# Patient Record
Sex: FEMALE | Race: WHITE | URBAN - METROPOLITAN AREA
[De-identification: names, ages, dates, MRNs, and addresses within clinical notes are randomized per-mention and may not be internally consistent; named-entity substitution may affect disease eponyms.]

---

## 2022-05-17 ENCOUNTER — TELEPHONE (OUTPATIENT)
Dept: PAIN MANAGEMENT | Age: 52
End: 2022-05-17

## 2022-05-17 ENCOUNTER — INITIAL CONSULT (OUTPATIENT)
Dept: PAIN MANAGEMENT | Age: 52
End: 2022-05-17
Payer: COMMERCIAL

## 2022-05-17 VITALS
HEIGHT: 64 IN | DIASTOLIC BLOOD PRESSURE: 86 MMHG | WEIGHT: 272 LBS | BODY MASS INDEX: 46.44 KG/M2 | TEMPERATURE: 97.8 F | SYSTOLIC BLOOD PRESSURE: 136 MMHG

## 2022-05-17 DIAGNOSIS — M51.36 DDD (DEGENERATIVE DISC DISEASE), LUMBAR: ICD-10-CM

## 2022-05-17 DIAGNOSIS — M25.562 CHRONIC PAIN OF LEFT KNEE: Primary | ICD-10-CM

## 2022-05-17 DIAGNOSIS — G89.29 CHRONIC BILATERAL LOW BACK PAIN WITHOUT SCIATICA: ICD-10-CM

## 2022-05-17 DIAGNOSIS — M54.50 CHRONIC BILATERAL LOW BACK PAIN WITHOUT SCIATICA: ICD-10-CM

## 2022-05-17 DIAGNOSIS — G89.29 CHRONIC PAIN OF LEFT KNEE: Primary | ICD-10-CM

## 2022-05-17 PROCEDURE — G8417 CALC BMI ABV UP PARAM F/U: HCPCS | Performed by: PAIN MEDICINE

## 2022-05-17 PROCEDURE — G8427 DOCREV CUR MEDS BY ELIG CLIN: HCPCS | Performed by: PAIN MEDICINE

## 2022-05-17 PROCEDURE — 1036F TOBACCO NON-USER: CPT | Performed by: PAIN MEDICINE

## 2022-05-17 PROCEDURE — 3017F COLORECTAL CA SCREEN DOC REV: CPT | Performed by: PAIN MEDICINE

## 2022-05-17 PROCEDURE — 99204 OFFICE O/P NEW MOD 45 MIN: CPT | Performed by: PAIN MEDICINE

## 2022-05-17 RX ORDER — ONDANSETRON 4 MG/1
TABLET, ORALLY DISINTEGRATING ORAL
COMMUNITY
Start: 2022-04-28

## 2022-05-17 RX ORDER — ASPIRIN 81 MG/1
TABLET, CHEWABLE ORAL
COMMUNITY

## 2022-05-17 RX ORDER — DICYCLOMINE HCL 20 MG
TABLET ORAL
COMMUNITY
Start: 2022-04-27

## 2022-05-17 RX ORDER — ATORVASTATIN CALCIUM 20 MG/1
TABLET, FILM COATED ORAL
COMMUNITY
Start: 2022-05-09

## 2022-05-17 RX ORDER — CLONIDINE HYDROCHLORIDE 0.2 MG/1
TABLET ORAL
COMMUNITY
Start: 2022-05-11

## 2022-05-17 RX ORDER — TIZANIDINE 4 MG/1
TABLET ORAL
COMMUNITY
Start: 2022-04-27

## 2022-05-17 ASSESSMENT — ENCOUNTER SYMPTOMS
NAUSEA: 0
CONSTIPATION: 0
BACK PAIN: 1
DIARRHEA: 0
SHORTNESS OF BREATH: 0

## 2022-05-17 NOTE — PROGRESS NOTES
[facility]) Physicians  Neurosurgery and Pain [facility]  [address], [address] [facility] , [address], [place]: [phone]  F: [phone]        Emely Mendez  (1970)    5/17/2022    Subjective:     Emely Mendez is 50 y.o. female who complains today of:    Chief Complaint   Patient presents with    Back Pain       HPI    Patient complains of chronic low back pain. The patient denies a traumatic or inciting incident. Pain has been gradual and insidious in onset. Pain is located in the lumbar spine. Pain is not radiating, but she does get some referred pain into the hips. It has been present for years. Pain is described as constatn, throbbing and aching. Pain level today is rated 6 on a 10 point scale. It is severe in nature. With pain medication (Motrin/Tylenol), pain level drops to a 6/10. Without pain medication, pain level can escalate upto a 8/10. She also takes Tizanidine occasionally at bedtime, and that help her sleep. Pain is affecting the patients ability to perform activities of daily living especially with regards to personal hygiene, household chores and self care. With pain medication, the patient is better able to perform ADLs. Pain in part is secondary to osteoarthritis of the spine. Pain is aggravated by bending, lifting, twisting, walking and standing  Pain is alleviated by rest and medication. Prior PT: Yes, she did a course here in the past and more recently with [facility] within the last year. SHe just competed it. Prior Injections: none  Prior medications: NSAIDs, muscle relaxants, opiods and analgesics  Prior spine surgeries:  none  Pain is not is associated with fevers/chills/night sweats. Bowel and bladder are working appropriately. She is also complaining of left knee pain. Acute on chronic. Throbbing, worse with ambulation.        Allergies:  Penicillins and Sulfamethoxazole-trimethoprim    Past Medical History:   Diagnosis Date    Hypertension      No past surgical history on file. Family History   Problem Relation Age of Onset    Arthritis Mother     High Blood Pressure Mother     Heart Disease Father     High Blood Pressure Father     Diabetes Father      Social History     Socioeconomic History    Marital status: Unknown     Spouse name: Not on file    Number of children: Not on file    Years of education: Not on file    Highest education level: Not on file   Occupational History    Occupation: unemployed   Tobacco Use    Smoking status: Former Smoker     Packs/day: 1.00     Years: 30.00     Pack years: 30.00     Quit date:      Years since quittin.3    Smokeless tobacco: Never Used   Substance and Sexual Activity    Alcohol use: Never    Drug use: Never    Sexual activity: Not on file   Other Topics Concern    Not on file   Social History Narrative    Not on file     Social Determinants of Health     Financial Resource Strain:     Difficulty of Paying Living Expenses: Not on file   Food Insecurity:     Worried About [address] in the Last Year: Not on file    [address] in the Last Year: Not on file   Transportation Needs:     Lack of Transportation (Medical): Not on file    Lack of Transportation (Non-Medical):  Not on file   Physical Activity:     Days of Exercise per Week: Not on file    Minutes of Exercise per Session: Not on file   Stress:     Feeling of Stress : Not on file   Social Connections:     Frequency of Communication with Friends and Family: Not on file    Frequency of Social Gatherings with Friends and Family: Not on file    Attends Yazidi Services: Not on file    Active Member of Clubs or Organizations: Not on file    Attends Club or Organization Meetings: Not on file    Marital Status: Not on file   Intimate Partner Violence:     Fear of Current or Ex-Partner: Not on file    Emotionally Abused: Not on file    Physically Abused: Not on file    Sexually Abused: Not on file   Housing Stability:     Unable to Pay for Housing in the Last Year: Not on file    Number of Places Lived in the Last Year: Not on file    Unstable Housing in the Last Year: Not on file       Current Outpatient Medications on File Prior to Visit   Medication Sig Dispense Refill    aspirin 81 MG chewable tablet Take by mouth      cloNIDine (CATAPRES) 0.2 MG tablet TAKE ONE TABLET BY MOUTH EVERY DAY      atorvastatin (LIPITOR) 20 MG tablet TAKE ONE TABLET BY MOUTH EVERY EVENING AFTER SUPPER      tiZANidine (ZANAFLEX) 4 MG tablet TAKE ONE TABLET BY MOUTH EVERY 8 HOURS AS NEEDED FOR MUSCLE SPASM      dicyclomine (BENTYL) 20 MG tablet TAKE ONE TABLET BY MOUTH EVERY 6 HOURS AS NEEDED      ondansetron (ZOFRAN-ODT) 4 MG disintegrating tablet DISSOLVE ONE TABLET IN MOUTH EVERY 6 HOURS AS NEEDED FOR NAUSEA AND VOMITING      atenolol (TENORMIN) 25 MG tablet   0    naproxen (NAPROSYN) 500 MG tablet take 1 tablet by mouth every 12 hours if needed for back pain take with food  0    hydrochlorothiazide (MICROZIDE) 12.5 MG capsule       meloxicam (MOBIC) 15 MG tablet Take 1 tablet by mouth daily Take with food, avoid other NSAIDs (Ibuprofen) and steroids 60 tablet 0     No current facility-administered medications on file prior to visit. Review of Systems   Constitutional: Negative for fever. HENT: Negative for hearing loss. Respiratory: Negative for shortness of breath. Gastrointestinal: Negative for constipation, diarrhea and nausea. Genitourinary: Negative for difficulty urinating. Musculoskeletal: Positive for back pain. Skin: Negative for rash. Neurological: Negative for headaches. Hematological: Does not bruise/bleed easily. Psychiatric/Behavioral: Negative for sleep disturbance.          Objective:     Vitals:  /86 (Site: Left Upper Arm, Position: Sitting, Cuff Size: Large Adult)   Temp 97.8 °F (36.6 °C) (Infrared)   Ht 5' 4\" (1.626 m)   Wt 272 lb (123.4 kg) BMI 46.69 kg/m² Pain Score:   8      Physical Exam    General Appearance: Obese, NAD and Conversant. Eyes: EOM intact. HENT: Atraumatic. Neck: Neck is supple and Trachea midline. Lymph: No supraclavicular lymphadenopathy. Lungs: Normal respiratory effort and No significant respiratory distress. Cardiovascular: No lower extremity ulcerations or cyanosis and Capillary refill is less than 2 seconds. Abdomen: Soft and Non tender to palpation. Extremeties: No significant lower exremity edema. Skin: Visualized skin is intact and she has normal skin turgor. Psych: Mood and affect within normal limits, Insight and judgement within normal limits and Alert and oriented. Inspection of the spine reveals no gross abnormalities in terms of curvature. ROM of the spine is within normal limits. Facet loading reproduces her symptoms. Palpation: she hasTTP over the lumbar paraspinal musculature. Muscle Tone is within normal limits in all four extremities. Strength: 5 in right upper extremity extremity. 5 in left upper extremity extremity. 5 in right lower extremity extremity. 5 in left lower extremity extremity. Neurologic:  Coordination is within normal limits. DTR's are intact and symmetric throughout. Sensation is within normal limits throughout in all four extremities to deep pressure and light touch. Gait is not within normal limits. Difficulty with heel walking, toe walking and tandem walking. Long Tract Signs: SLR: Negative. Spurling's Maneuver: Negative. Plantar Response: Downgoing bilaterally. Antunez sign is negative bilaterally    Inspection of the left knee shows no gross abnormalities or gross effusions. No significant warmth to palpation. No evidence of any gross ligamentous instability. There is significant tenderness to palpation of the left joint lines. DATA REVIEW:   XR PELVIS 6/22/2016:  Some arthrits noted in bilateral hips and SI joints.      XR LUMBAR SPINE 6/22/2016:  Posterior element sclerosis noted and ddd. Chronic T11/12 anterior wedging noted. Assessment:      Diagnosis Orders   1. Chronic pain of left knee  XR KNEE LEFT (3 VIEWS)    XR KNEE BILATERAL STANDING   2. DDD (degenerative disc disease), lumbar  XR LUMBAR SPINE (MIN 4 VIEWS)   3. Chronic bilateral low back pain without sciatica  XR LUMBAR SPINE (MIN 4 VIEWS)       Plan:          No orders of the defined types were placed in this encounter. Orders Placed This Encounter   Procedures    XR KNEE LEFT (3 VIEWS)     Standing Status:   Future     Standing Expiration Date:   8/15/2022     Order Specific Question:   Reason for exam:     Answer:   chronic knee pain    XR KNEE BILATERAL STANDING     Standing Status:   Future     Standing Expiration Date:   8/15/2022     Order Specific Question:   Reason for exam:     Answer:   knee pian    XR LUMBAR SPINE (MIN 4 VIEWS)     Standing Status:   Future     Standing Expiration Date:   8/15/2022     Scheduling Instructions:      XR LUMBAR AP LAT FLEX EXT     Order Specific Question:   Reason for exam:     Answer:   axial low back pain     1. Check xray of lumbar spine. 2. Check xray of left knee. 3. Consider knee injection and MBBs. Will discuss after reviewing results. Follow up:  Return for Follow Up 1 month. The patient will follow up for reevaluation of her pain. The patient is aware of the treatment plan and in agreement. All of her questions were answered.      Margarita Henriquez MD

## 2022-05-18 DIAGNOSIS — G89.29 CHRONIC PAIN OF LEFT KNEE: ICD-10-CM

## 2022-05-18 DIAGNOSIS — M54.50 CHRONIC BILATERAL LOW BACK PAIN WITHOUT SCIATICA: ICD-10-CM

## 2022-05-18 DIAGNOSIS — G89.29 CHRONIC BILATERAL LOW BACK PAIN WITHOUT SCIATICA: ICD-10-CM

## 2022-05-18 DIAGNOSIS — M25.562 CHRONIC PAIN OF LEFT KNEE: ICD-10-CM

## 2022-05-18 DIAGNOSIS — M51.36 DDD (DEGENERATIVE DISC DISEASE), LUMBAR: ICD-10-CM

## 2022-06-22 ENCOUNTER — TELEPHONE (OUTPATIENT)
Dept: PAIN MANAGEMENT | Age: 52
End: 2022-06-22

## 2022-06-22 ENCOUNTER — OFFICE VISIT (OUTPATIENT)
Dept: PAIN MANAGEMENT | Age: 52
End: 2022-06-22
Payer: COMMERCIAL

## 2022-06-22 VITALS
WEIGHT: 262 LBS | DIASTOLIC BLOOD PRESSURE: 82 MMHG | TEMPERATURE: 96.9 F | SYSTOLIC BLOOD PRESSURE: 122 MMHG | HEIGHT: 65 IN | BODY MASS INDEX: 43.65 KG/M2

## 2022-06-22 DIAGNOSIS — M51.36 DDD (DEGENERATIVE DISC DISEASE), LUMBAR: ICD-10-CM

## 2022-06-22 DIAGNOSIS — M17.12 LOCALIZED OSTEOARTHRITIS OF LEFT KNEE: Primary | ICD-10-CM

## 2022-06-22 PROCEDURE — 99214 OFFICE O/P EST MOD 30 MIN: CPT | Performed by: NURSE PRACTITIONER

## 2022-06-22 PROCEDURE — 1036F TOBACCO NON-USER: CPT | Performed by: NURSE PRACTITIONER

## 2022-06-22 PROCEDURE — G8417 CALC BMI ABV UP PARAM F/U: HCPCS | Performed by: NURSE PRACTITIONER

## 2022-06-22 PROCEDURE — G8427 DOCREV CUR MEDS BY ELIG CLIN: HCPCS | Performed by: NURSE PRACTITIONER

## 2022-06-22 PROCEDURE — 3017F COLORECTAL CA SCREEN DOC REV: CPT | Performed by: NURSE PRACTITIONER

## 2022-06-22 ASSESSMENT — ENCOUNTER SYMPTOMS
DIARRHEA: 0
CONSTIPATION: 0
BACK PAIN: 1
RESPIRATORY NEGATIVE: 1

## 2022-06-22 NOTE — PROGRESS NOTES
Patient: Kay Schuster  YOB: 1970  Date: 22        Subjective:     Kay Schuster is a 50 y.o. female who complains today of:    Chief Complaint   Patient presents with    Follow-up     Lower Back / L Knee Pain (XR Results)         Allergies:  Penicillins and Sulfamethoxazole-trimethoprim    Past Medical History:   Diagnosis Date    Hypertension      History reviewed. No pertinent surgical history. Family History   Problem Relation Age of Onset    Arthritis Mother     High Blood Pressure Mother     Heart Disease Father     High Blood Pressure Father     Diabetes Father      Social History     Socioeconomic History    Marital status:      Spouse name: Not on file    Number of children: Not on file    Years of education: Not on file    Highest education level: Not on file   Occupational History    Occupation: unemployed   Tobacco Use    Smoking status: Former Smoker     Packs/day: 1.00     Years: 30.00     Pack years: 30.00     Quit date:      Years since quittin.4    Smokeless tobacco: Never Used   Substance and Sexual Activity    Alcohol use: Never    Drug use: Never    Sexual activity: Not on file   Other Topics Concern    Not on file   Social History Narrative    Not on file     Social Determinants of Health     Financial Resource Strain:     Difficulty of Paying Living Expenses: Not on file   Food Insecurity:     Worried About [address] in the Last Year: Not on file    [address] in the Last Year: Not on file   Transportation Needs:     Lack of Transportation (Medical): Not on file    Lack of Transportation (Non-Medical):  Not on file   Physical Activity:     Days of Exercise per Week: Not on file    Minutes of Exercise per Session: Not on file   Stress:     Feeling of Stress : Not on file   Social Connections:     Frequency of Communication with Friends and Family: Not on file    Frequency of Social Gatherings with Friends and Family: Not on file    Attends Baptism Services: Not on file    Active Member of Clubs or Organizations: Not on file    Attends Club or Organization Meetings: Not on file    Marital Status: Not on file   Intimate Partner Violence:     Fear of Current or Ex-Partner: Not on file    Emotionally Abused: Not on file    Physically Abused: Not on file    Sexually Abused: Not on file   Housing Stability:     Unable to Pay for Housing in the Last Year: Not on file    Number of Places Lived in the Last Year: Not on file    Unstable Housing in the Last Year: Not on file       Current Outpatient Medications on File Prior to Visit   Medication Sig Dispense Refill    aspirin 81 MG chewable tablet Take by mouth      cloNIDine (CATAPRES) 0.2 MG tablet TAKE ONE TABLET BY MOUTH EVERY DAY      atorvastatin (LIPITOR) 20 MG tablet TAKE ONE TABLET BY MOUTH EVERY EVENING AFTER SUPPER      tiZANidine (ZANAFLEX) 4 MG tablet TAKE ONE TABLET BY MOUTH EVERY 8 HOURS AS NEEDED FOR MUSCLE SPASM      dicyclomine (BENTYL) 20 MG tablet TAKE ONE TABLET BY MOUTH EVERY 6 HOURS AS NEEDED      ondansetron (ZOFRAN-ODT) 4 MG disintegrating tablet DISSOLVE ONE TABLET IN MOUTH EVERY 6 HOURS AS NEEDED FOR NAUSEA AND VOMITING      atenolol (TENORMIN) 25 MG tablet   0    naproxen (NAPROSYN) 500 MG tablet take 1 tablet by mouth every 12 hours if needed for back pain take with food  0    hydrochlorothiazide (MICROZIDE) 12.5 MG capsule       meloxicam (MOBIC) 15 MG tablet Take 1 tablet by mouth daily Take with food, avoid other NSAIDs (Ibuprofen) and steroids 60 tablet 0     No current facility-administered medications on file prior to visit. 61-year-old female following up today for chronic back pain. She had initial consult with Dr. Shahab Noble on 5/17/2022. Stays mostly in the low back but will go around to her hips. She has been suffering with this for years. It is constant throbbing and aching.   Completed physical therapy within this year without any relief. Also had chiropractic adjustments and trigger point injections. Was taking Tylenol, Motrin and tizanidine Flexeril occasionally. History of back surgery. She also has left knee pain. Knee x-rays ordered and performed on 5/17/2022 showing moderate to severe patellofemoral osteoarthritis to the left. X-ray lumbar spine on 5/17/2022 mild thoracolumbar arthritis. Sclerosis to the SI joints. Normal alignment and no instability. Review of Systems   Constitutional: Negative. Negative for activity change and unexpected weight change. HENT: Negative. Negative for hearing loss. Respiratory: Negative. Cardiovascular: Negative for leg swelling. Gastrointestinal: Negative for constipation and diarrhea. Genitourinary: Negative. Musculoskeletal: Positive for arthralgias and back pain. Negative for gait problem, joint swelling and neck pain. Skin: Negative for rash. Neurological: Negative for dizziness, weakness, numbness and headaches. Psychiatric/Behavioral: Negative. Negative for sleep disturbance. Objective:     Vitals:  /82 (Site: Left Upper Arm, Position: Sitting, Cuff Size: Medium Adult)   Temp 96.9 °F (36.1 °C) (Infrared)   Ht 5' 4.5\" (1.638 m)   Wt 262 lb (118.8 kg)   BMI 44.28 kg/m² Pain Score:   6    Physical Exam  Vitals reviewed. Constitutional:       Appearance: She is well-developed. HENT:      Head: Normocephalic. Nose: Nose normal.   Pulmonary:      Effort: Pulmonary effort is normal.   Musculoskeletal:      Cervical back: Normal range of motion and neck supple. Lumbar back: Tenderness present. Decreased range of motion. Negative right straight leg raise test and negative left straight leg raise test.      Left knee: No swelling or deformity. Decreased range of motion. Tenderness present over the patellar tendon. Lymphadenopathy:      Cervical: No cervical adenopathy. Skin:     General: Skin is warm and dry. Findings: No erythema or rash. Neurological:      Mental Status: She is alert and oriented to person, place, and time. Cranial Nerves: No cranial nerve deficit. Deep Tendon Reflexes: Reflexes are normal and symmetric. Reflexes normal.   Psychiatric:         Mood and Affect: Mood normal.         Behavior: Behavior normal.         Thought Content: Thought content normal.         Judgment: Judgment normal.            Assessment:        Diagnosis Orders   1. Localized osteoarthritis of left knee  CT ARTHROCENTESIS ASPIR&/INJ MAJOR JT/BURSA W/O US    CHG FLUOROSCOPIC GUIDANCE NEEDLE PLACEMENT ADD ON    CT KO SINGLE UPRIGHT PREFAB OTS   2. DDD (degenerative disc disease), lumbar         Plan:          No orders of the defined types were placed in this encounter. Orders Placed This Encounter   Procedures    CHG FLUOROSCOPIC GUIDANCE NEEDLE PLACEMENT ADD ON     Standing Status:   Future     Standing Expiration Date:   9/20/2022    CT ARTHROCENTESIS ASPIR&/INJ MAJOR JT/BURSA W/O US     Left steroid knee with [employer]     Standing Status:   Future     Standing Expiration Date:   9/20/2022    CT KO SINGLE UPRIGHT PREFAB OTS     Knee Brace Freestyle OA    patellofemoral OA moderate to severe     Standing Status:   Future     Standing Expiration Date:   9/20/2022     -left knee steroid injection for pain and function  -declines any back injections due to previous bad experience  Given the patient's knee condition, we will order a Freestyle knee brace, left knee, to help reduce pain, improve function and unload the patellofemoral OA compartment. she is ambulatory but has weakness and instability of the left knee which requires stabilization from this semi-rigid/rigid orthosis to improve function. Follow up:  Return in about 4 weeks (around 7/20/2022) for f/u after procedure and reassess pain/medications.     Adrian Jimenez, APRN - CNP

## 2022-06-22 NOTE — TELEPHONE ENCOUNTER
LEFT KNEE INJ    NO AUTH REQUIRED    OK to schedule procedure approved as above. Please note sides/levels approved and date range. (If applicable, sides/levels approved may differ from those ordered)    TO BE SCHEDULED WITH DR. ARMAS

## 2022-06-22 NOTE — TELEPHONE ENCOUNTER
ORDER PLACED:    Date: 6/22/22  Description: LEFT KNEE INJ  Order Number: [id number]  Ordering Provider: Hiral Quesada  Performing Provider: PAWEL  CPT Codes: 00516  ICD10 Codes: M75.17

## 2022-06-22 NOTE — TELEPHONE ENCOUNTER
ORDER PLACED:    Date: 6/22/22  Description: FREESTYLE OA KNEE BRACE  Order Number: [id number]  Ordering Provider: Jim Rivers  Performing Provider:   CPT Codes:   ICD10 Codes: N02.43

## 2022-07-06 NOTE — TELEPHONE ENCOUNTER
INS DENIED FREESTYLE OA KNEE BRACE        INS DENIAL LETTER DATED 7/2/2022  REF# N/A        INS DENIAL REASON: The request does not meet [facility] or Medicaid guidelines. You did not meet the criteria because there is no documentation of bicompartmental arthritic changes-only the medial compartment showed arthritis on the x-rays. There was no evidence of a ligament injury. Based on guideline MCG A 0332 Knee Braces      PLEASE ADVISE PATIENT AND PROVIDER. PATIENT DOES NOT MEET MEDICAL CRITERIA. RECOMMEND FOLLOW UP TO DISCUSS OPTIONS.

## 2023-02-22 LAB
ALANINE AMINOTRANSFERASE (SGPT) (U/L) IN SER/PLAS: 13 U/L (ref 7–45)
ALBUMIN (G/DL) IN SER/PLAS: 3.9 G/DL (ref 3.4–5)
ALKALINE PHOSPHATASE (U/L) IN SER/PLAS: 77 U/L (ref 33–110)
ANION GAP IN SER/PLAS: 13 MMOL/L (ref 10–20)
ASPARTATE AMINOTRANSFERASE (SGOT) (U/L) IN SER/PLAS: 14 U/L (ref 9–39)
BILIRUBIN TOTAL (MG/DL) IN SER/PLAS: 0.5 MG/DL (ref 0–1.2)
CALCIDIOL (25 OH VITAMIN D3) (NG/ML) IN SER/PLAS: 12 NG/ML
CALCIUM (MG/DL) IN SER/PLAS: 9.4 MG/DL (ref 8.6–10.3)
CARBON DIOXIDE, TOTAL (MMOL/L) IN SER/PLAS: 29 MMOL/L (ref 21–32)
CHLORIDE (MMOL/L) IN SER/PLAS: 100 MMOL/L (ref 98–107)
CHOLESTEROL (MG/DL) IN SER/PLAS: 103 MG/DL (ref 0–199)
CHOLESTEROL IN HDL (MG/DL) IN SER/PLAS: 36.7 MG/DL
CHOLESTEROL/HDL RATIO: 2.8
COBALAMIN (VITAMIN B12) (PG/ML) IN SER/PLAS: 193 PG/ML (ref 211–911)
CREATININE (MG/DL) IN SER/PLAS: 0.68 MG/DL (ref 0.5–1.05)
ESTIMATED AVERAGE GLUCOSE FOR HBA1C: 137 MG/DL
FOLATE (NG/ML) IN SER/PLAS: 13.1 NG/ML
GFR FEMALE: >90 ML/MIN/1.73M2
GLUCOSE (MG/DL) IN SER/PLAS: 131 MG/DL (ref 74–99)
HEMOGLOBIN A1C/HEMOGLOBIN TOTAL IN BLOOD: 6.4 %
LDL: 46 MG/DL (ref 0–99)
POTASSIUM (MMOL/L) IN SER/PLAS: 3.5 MMOL/L (ref 3.5–5.3)
PROTEIN TOTAL: 7.1 G/DL (ref 6.4–8.2)
SODIUM (MMOL/L) IN SER/PLAS: 138 MMOL/L (ref 136–145)
THYROTROPIN (MIU/L) IN SER/PLAS BY DETECTION LIMIT <= 0.05 MIU/L: 2.85 MIU/L (ref 0.44–3.98)
THYROXINE (T4) FREE (NG/DL) IN SER/PLAS: 0.73 NG/DL (ref 0.61–1.12)
TRIGLYCERIDE (MG/DL) IN SER/PLAS: 102 MG/DL (ref 0–149)
UREA NITROGEN (MG/DL) IN SER/PLAS: 12 MG/DL (ref 6–23)
VLDL: 20 MG/DL (ref 0–40)

## 2023-06-05 LAB
ALANINE AMINOTRANSFERASE (SGPT) (U/L) IN SER/PLAS: 16 U/L (ref 7–45)
ALBUMIN (G/DL) IN SER/PLAS: 4.1 G/DL (ref 3.4–5)
ALKALINE PHOSPHATASE (U/L) IN SER/PLAS: 64 U/L (ref 33–110)
ANION GAP IN SER/PLAS: 13 MMOL/L (ref 10–20)
ASPARTATE AMINOTRANSFERASE (SGOT) (U/L) IN SER/PLAS: 16 U/L (ref 9–39)
BILIRUBIN TOTAL (MG/DL) IN SER/PLAS: 0.4 MG/DL (ref 0–1.2)
CALCIDIOL (25 OH VITAMIN D3) (NG/ML) IN SER/PLAS: 74 NG/ML
CALCIUM (MG/DL) IN SER/PLAS: 8.9 MG/DL (ref 8.6–10.3)
CARBON DIOXIDE, TOTAL (MMOL/L) IN SER/PLAS: 26 MMOL/L (ref 21–32)
CHLORIDE (MMOL/L) IN SER/PLAS: 104 MMOL/L (ref 98–107)
CHOLESTEROL (MG/DL) IN SER/PLAS: 95 MG/DL (ref 0–199)
CHOLESTEROL IN HDL (MG/DL) IN SER/PLAS: 32.1 MG/DL
CHOLESTEROL/HDL RATIO: 3
COBALAMIN (VITAMIN B12) (PG/ML) IN SER/PLAS: 301 PG/ML (ref 211–911)
CREATININE (MG/DL) IN SER/PLAS: 0.75 MG/DL (ref 0.5–1.05)
ESTIMATED AVERAGE GLUCOSE FOR HBA1C: 105 MG/DL
FOLATE (NG/ML) IN SER/PLAS: 10.4 NG/ML
GFR FEMALE: >90 ML/MIN/1.73M2
GLUCOSE (MG/DL) IN SER/PLAS: 129 MG/DL (ref 74–99)
HEMOGLOBIN A1C/HEMOGLOBIN TOTAL IN BLOOD: 5.3 %
LDL: 44 MG/DL (ref 0–99)
POTASSIUM (MMOL/L) IN SER/PLAS: 3.8 MMOL/L (ref 3.5–5.3)
PROTEIN TOTAL: 7.1 G/DL (ref 6.4–8.2)
SODIUM (MMOL/L) IN SER/PLAS: 139 MMOL/L (ref 136–145)
TRIGLYCERIDE (MG/DL) IN SER/PLAS: 96 MG/DL (ref 0–149)
UREA NITROGEN (MG/DL) IN SER/PLAS: 16 MG/DL (ref 6–23)
VLDL: 19 MG/DL (ref 0–40)

## 2023-10-31 ENCOUNTER — HOSPITAL ENCOUNTER (EMERGENCY)
Facility: HOSPITAL | Age: 53
Discharge: HOME | End: 2023-10-31
Payer: COMMERCIAL

## 2023-10-31 VITALS
SYSTOLIC BLOOD PRESSURE: 178 MMHG | BODY MASS INDEX: 46.59 KG/M2 | HEART RATE: 52 BPM | HEIGHT: 65 IN | RESPIRATION RATE: 18 BRPM | OXYGEN SATURATION: 100 % | TEMPERATURE: 96.8 F | DIASTOLIC BLOOD PRESSURE: 80 MMHG

## 2023-10-31 DIAGNOSIS — M75.22 BICEPS TENDINITIS OF LEFT UPPER EXTREMITY: Primary | ICD-10-CM

## 2023-10-31 PROCEDURE — 99283 EMERGENCY DEPT VISIT LOW MDM: CPT

## 2023-10-31 RX ORDER — HYDROCODONE BITARTRATE AND ACETAMINOPHEN 5; 325 MG/1; MG/1
1 TABLET ORAL EVERY 6 HOURS PRN
Qty: 12 TABLET | Refills: 0 | Status: SHIPPED | OUTPATIENT
Start: 2023-10-31 | End: 2023-11-03

## 2023-10-31 RX ORDER — PREDNISONE 20 MG/1
TABLET ORAL
Qty: 18 TABLET | Refills: 0 | Status: SHIPPED | OUTPATIENT
Start: 2023-10-31

## 2023-10-31 ASSESSMENT — LIFESTYLE VARIABLES
EVER HAD A DRINK FIRST THING IN THE MORNING TO STEADY YOUR NERVES TO GET RID OF A HANGOVER: NO
HAVE PEOPLE ANNOYED YOU BY CRITICIZING YOUR DRINKING: NO
REASON UNABLE TO ASSESS: NO
EVER FELT BAD OR GUILTY ABOUT YOUR DRINKING: NO
HAVE YOU EVER FELT YOU SHOULD CUT DOWN ON YOUR DRINKING: NO

## 2023-10-31 ASSESSMENT — PAIN DESCRIPTION - LOCATION: LOCATION: ELBOW

## 2023-10-31 ASSESSMENT — PAIN SCALES - GENERAL: PAINLEVEL_OUTOF10: 7

## 2023-10-31 ASSESSMENT — PAIN DESCRIPTION - PAIN TYPE: TYPE: ACUTE PAIN

## 2023-10-31 ASSESSMENT — PAIN - FUNCTIONAL ASSESSMENT: PAIN_FUNCTIONAL_ASSESSMENT: 0-10

## 2023-10-31 NOTE — ED PROVIDER NOTES
HPI   Chief Complaint   Patient presents with    Elbow Pain       History provided by: Patient    Limitations to history: None    CC: Left elbow pain    HPI: 53-year-old female presents the emergency department to be evaluated for left elbow pain.  She states this been present for the last 4 to 5 days.  She denies swelling, warmth or redness.  Denies fever and chills.  She denies any injury, new exercises, heavy lifting, repetitive motions.  She is been taking some ibuprofen and Tylenol with little to no relief.  She is supposed to follow-up with her primary care provider but was unable to get another appointment for a few weeks.  Denies history of blood clots and denies recent travel or surgeries.  She denies any pain in her upper arm or into her shoulder.  Denies any neck pain.  Denies back pain.  Denies chest pain, shortness of breath, cough hemoptysis.  States that the pain is worse with movement.  Denies history of heart or lung disease.  Denies headache or vision changes.  Denies all other systemic symptoms.    ROS: Negative unless mentioned in HPI    Social Hx: Denies tobacco, alcohol, drug use    Medical Hx:  medical history sent in for well controlled type 2 diabetes.  Denies allergies.  Immunizations are up-to-date.    Surgical HX: Denies    Physical exam:    Constitutional: Patient is well-nourished and well-developed.  Sitting comfortably in the room and in no distress.  Oriented to person, place, time, and situation.    HEENT: Head is normocephalic, atraumatic. Patient's airway is patent.  Tympanic membranes are clear bilaterally.  Nasal mucosa clear.  Mouth with normal mucosa.  Throat is not erythematous and there are no oropharyngeal exudates, uvula is midline.  No obvious facial deformities.    Eyes: Clear bilaterally.  Pupils are equal round and reactive to light and accommodation.  Extraocular movements intact.      Cardiac: Regular rate, regular rhythm.  Heart sounds S1, S2.  No murmurs, rubs, or  gallops.  PMI nondisplaced.  No JVD.    Respiratory: Regular respiratory rate and effort.  Breath sounds are clear and equal bilaterally, no adventitious lung sounds.  Patient is speaking in full sentences and is in no apparent respiratory distress. No use of accessory muscles.      Gastrointestinal: Abdomen is soft, nondistended, and nontender.  There are no obvious deformities.  No rebound tenderness or guarding.  Bowel sounds are normal active.    Genitourinary: No CVA or flank tenderness.    Musculoskeletal: Patient has pain over the lateral malleolus and pain with resisted extension of the wrist.  Also has a positive Yergason exam.  No extremity warmth, swelling, redness.  Patient has tenderness over the lateral malleolus and does have no obvious skin or bony deformities.  Patient has equal range of motion in all extremities and no strength deficiencies.  No back or neck tenderness.  Capillary refill less than 3 seconds.  Strong peripheral pulses.  No sensory deficits.    Neurological: Patient is alert and oriented.  No focal deficits.  5/5 strength in all extremities.  Cranial nerves II through XII intact. GCS15.     Skin: Skin is normal color for race and is warm, dry, and intact.  No evidence of trauma.  No lesions, rashes, bruising, jaundice, or masses.    Psych: Appropriate mood and affect.  No apparent risk to self or others.    Heme/lymph: No adenopathy, lymphadenopathy, or splenomegaly    Physical exam is otherwise negative unless stated above or in history of present illness.    Patient updated on plan for lab testing, IV insertion, radiology imaging, and medications to be administered while in the ER (if indicated). Patient updated on expected wait times for testing and results. Patient provided my name and told to ask any staff member for questions or concerns if they should arise. Electronic medical record reviewed.     MDM    Upon initial assessment, patient was healthy non-toxic appearing and in  no apparent distress.     Patient presented to the emergency department with the chief complaint left elbow pain. Patient has pain over the lateral malleolus and pain with resisted extension of the wrist.  Also has a positive Yergason exam.  No extremity warmth, swelling, redness.  Patient has tenderness over the lateral malleolus and does have no obvious skin or bony deformities.  Patient has equal range of motion in all extremities and no strength deficiencies.  No back or neck tenderness.  Capillary refill less than 3 seconds.  Strong peripheral pulses.  No sensory deficits. On arrival to the emergency department, vital signs were within normal limits    Patient's history and physical exam is most consistent with lateral epicondylitis and bicep tendinitis.  There are no findings that would suggest PAD, acute arterial occlusion, or DVT.  Very low suspicion for ACS especially given that this is worse with movement and given her history and physical exam.  Patient will be discharged with a prednisone taper and I will give her instructions on a counter brace.  We will also be given an Ace wrap.  I discussed rest, ice, compression, elevation.  I also discussed stretching techniques.  We will give her a few doses of p.o. Norco for breakthrough pain however I did recommend that she primarily take NSAIDs given that this is the best treatment.  She will follow-up with her primary care provider I will [facility], I do believe she could benefit from physical therapy or steroid injections.  All questions and concerns addressed.  Reasons to return to ER discussed.  Patient verbalized understanding and agreement with the treatment plan and they remained hemodynamically stable in the ER.    This note was dictated using a speech recognition program.  While an attempt was made at proof-reading to minimize errors, minor errors in transcription may be present                          No data  recorded                Patient History   No past medical history on file.  Past Surgical History:   Procedure Laterality Date    MR HEAD ANGIO WO IV CONTRAST  2022    MR HEAD ANGIO WO IV CONTRAST 2022 [facility]    MR NECK ANGIO WO IV CONTRAST  2022    MR NECK ANGIO WO IV CONTRAST 2022 [facility]    OTHER SURGICAL HISTORY  2021     section    OTHER SURGICAL HISTORY  2021    Incision & drainage    OTHER SURGICAL HISTORY  2021    Tonsillectomy     No family history on file.  Social History     Tobacco Use    Smoking status: Not on file    Smokeless tobacco: Not on file   Substance Use Topics    Alcohol use: Not on file    Drug use: Not on file       Physical Exam   ED Triage Vitals [10/31/23 1332]   Temp Heart Rate Resp BP   36 °C (96.8 °F) 52 18 178/80      SpO2 Temp Source Heart Rate Source Patient Position   100 % Temporal Monitor --      BP Location FiO2 (%)     -- --       Physical Exam    ED Course & MDM   Diagnoses as of 10/31/23 1349   Biceps tendinitis of left upper extremity       Medical Decision Making      Procedure  Procedures     Collin Sousa PA-C  10/31/23 1475

## 2023-11-01 ENCOUNTER — ANCILLARY PROCEDURE (OUTPATIENT)
Dept: RADIOLOGY | Facility: CLINIC | Age: 53
End: 2023-11-01
Payer: COMMERCIAL

## 2023-11-01 ENCOUNTER — OFFICE VISIT (OUTPATIENT)
Dept: ORTHOPEDIC SURGERY | Facility: CLINIC | Age: 53
End: 2023-11-01
Payer: COMMERCIAL

## 2023-11-01 DIAGNOSIS — M77.12 LATERAL EPICONDYLITIS OF LEFT ELBOW: ICD-10-CM

## 2023-11-01 DIAGNOSIS — G56.03 BILATERAL CARPAL TUNNEL SYNDROME: ICD-10-CM

## 2023-11-01 DIAGNOSIS — M25.522 LEFT ELBOW PAIN: ICD-10-CM

## 2023-11-01 DIAGNOSIS — M25.522 LEFT ELBOW PAIN: Primary | ICD-10-CM

## 2023-11-01 PROCEDURE — 73080 X-RAY EXAM OF ELBOW: CPT | Mod: LEFT SIDE | Performed by: ORTHOPAEDIC SURGERY

## 2023-11-01 PROCEDURE — 99203 OFFICE O/P NEW LOW 30 MIN: CPT | Performed by: ORTHOPAEDIC SURGERY

## 2023-11-01 PROCEDURE — L3908 WHO COCK-UP NONMOLDE PRE OTS: HCPCS | Performed by: ORTHOPAEDIC SURGERY

## 2023-11-01 PROCEDURE — 73080 X-RAY EXAM OF ELBOW: CPT | Mod: LT

## 2023-11-01 PROCEDURE — 99213 OFFICE O/P EST LOW 20 MIN: CPT | Performed by: ORTHOPAEDIC SURGERY

## 2023-11-01 RX ORDER — ATORVASTATIN CALCIUM 40 MG/1
40 TABLET, FILM COATED ORAL NIGHTLY
COMMUNITY
Start: 2023-10-02

## 2023-11-01 RX ORDER — CLONIDINE HYDROCHLORIDE 0.3 MG/1
0.3 TABLET ORAL DAILY
COMMUNITY
Start: 2023-10-04

## 2023-11-01 RX ORDER — CHOLECALCIFEROL (VITAMIN D3) 25 MCG
1000 TABLET ORAL DAILY
COMMUNITY
Start: 2023-09-14

## 2023-11-01 RX ORDER — EPINEPHRINE 0.3 MG/.3ML
0.3 INJECTION INTRAMUSCULAR ONCE AS NEEDED
COMMUNITY
Start: 2021-09-06

## 2023-11-01 RX ORDER — LORATADINE 10 MG/1
10 TABLET ORAL DAILY
COMMUNITY
Start: 2023-05-14

## 2023-11-01 RX ORDER — ALPRAZOLAM 1 MG/1
1 TABLET ORAL NIGHTLY PRN
COMMUNITY
Start: 2023-10-09

## 2023-11-01 RX ORDER — OMEPRAZOLE 20 MG/1
20 CAPSULE, DELAYED RELEASE ORAL DAILY
COMMUNITY
Start: 2023-10-02

## 2023-11-01 RX ORDER — ATENOLOL 25 MG/1
25 TABLET ORAL 2 TIMES DAILY
COMMUNITY

## 2023-11-01 NOTE — PROGRESS NOTES
History: Carla is here today for her left elbow and hands.  She started to get pain a week ago in the elbow.  It is on the outside of the elbow but she was concerned and went to the ER yesterday.  They diagnosed her with tendinitis and told her to follow-up with orthopedics.  She also has a few month history of numbness and tingling in the hands mainly at night.  It tends to wake her up from sleep.  She is here today as a new patient for these multiple issues.    Past medical history: Multiple  Medications: Multiple  Allergies: No known drug allergies    Please refer to the intake H&P regarding the patient's review of systems, family history and social history as was done today    HEENT: Normal  Lungs: Clear to auscultation  Heart: RRR  Abdomen: Soft, nontender  Skin: clear  Extremity: This is a pleasant 53-year-old female in no apparent distress.  She has full elbow range of motion.  She has pain to palpation around the lateral epicondyle.  She has no pain medially.  Normal hook sign.  She has significant pain with resisted long finger extension.  She has a mildly positive Tinel's bilaterally.  She has full hand range of motion.  She does get numbness and tingling in both hands at night.  Contralateral exam is normal for strength, motion, stability and neurovascular assessment.    Radiographs: AP lateral and oblique views of the left elbow show no evidence of any acute abnormality.    Assessment: 1.  Left elbow lateral epicondylitis 2.  Likely bilateral carpal tunnel syndrome    Plan: She has a classic tennis elbow.  We discussed using a brace for support.  She was given a prednisone taper in the ER and she can finish out the steroid and transition to her ibuprofen 800 as tolerated.  She is 1 to get started in a formal therapy program following a lateral epicondylitis protocol.  She would also like to try night splints for the wrists.  We will see her back in another 5 to 6 weeks to assess progress.  If not  improving we could consider further imaging or EMG testing.  All questions were answered today with the patient.    This note was generated with voice recognition software and may contain grammatical errors.

## 2023-11-03 PROBLEM — R10.11 RIGHT UPPER QUADRANT ABDOMINAL PAIN: Status: ACTIVE | Noted: 2023-11-03

## 2023-11-03 PROBLEM — F41.9 ANXIETY: Status: ACTIVE | Noted: 2023-11-03

## 2023-11-03 PROBLEM — E78.5 HYPERLIPIDEMIA: Status: ACTIVE | Noted: 2023-11-03

## 2023-11-03 PROBLEM — K42.9 UMBILICAL HERNIA: Status: ACTIVE | Noted: 2023-11-03

## 2023-11-03 PROBLEM — I10 HYPERTENSION: Status: ACTIVE | Noted: 2023-11-03

## 2023-11-03 PROBLEM — K59.00 CONSTIPATION: Status: ACTIVE | Noted: 2023-11-03

## 2023-11-03 PROBLEM — R42 VERTIGO: Status: ACTIVE | Noted: 2023-11-03

## 2023-11-03 PROBLEM — K80.50 BILIARY COLIC: Status: ACTIVE | Noted: 2023-11-03

## 2023-11-03 RX ORDER — CYANOCOBALAMIN 1000 UG/ML
INJECTION, SOLUTION INTRAMUSCULAR; SUBCUTANEOUS
COMMUNITY
Start: 2023-05-02

## 2023-11-03 RX ORDER — NAPROXEN 500 MG/1
TABLET ORAL
COMMUNITY
Start: 2016-07-06 | End: 2023-12-25

## 2023-11-03 RX ORDER — MELOXICAM 15 MG/1
15 TABLET ORAL DAILY
COMMUNITY
Start: 2017-01-27

## 2023-11-03 RX ORDER — TERCONAZOLE 4 MG/G
CREAM VAGINAL
COMMUNITY
Start: 2023-04-20

## 2023-11-03 RX ORDER — SYRINGE-NEEDLE,INSULIN,0.5 ML 28GX1/2"
600 SYRINGE, EMPTY DISPOSABLE MISCELLANEOUS EVERY 12 HOURS PRN
COMMUNITY
Start: 2022-12-06

## 2023-11-03 RX ORDER — NAPROXEN SODIUM 220 MG/1
TABLET, FILM COATED ORAL
COMMUNITY

## 2023-11-03 RX ORDER — DOXYCYCLINE 100 MG/1
100 CAPSULE ORAL 2 TIMES DAILY
COMMUNITY
Start: 2022-12-06

## 2023-11-03 RX ORDER — SUCRALFATE 1 G/1
1 TABLET ORAL 3 TIMES DAILY
COMMUNITY
Start: 2021-06-02

## 2023-11-03 RX ORDER — ALPRAZOLAM 0.5 MG/1
1 TABLET ORAL DAILY PRN
COMMUNITY

## 2023-11-03 RX ORDER — ONDANSETRON 4 MG/1
TABLET, ORALLY DISINTEGRATING ORAL
COMMUNITY

## 2023-11-03 RX ORDER — TRIAMCINOLONE ACETONIDE 5 MG/G
CREAM TOPICAL
COMMUNITY
Start: 2023-05-30

## 2023-11-03 RX ORDER — ATORVASTATIN CALCIUM 10 MG/1
10 TABLET, FILM COATED ORAL
COMMUNITY

## 2023-11-03 RX ORDER — TIZANIDINE 4 MG/1
TABLET ORAL
COMMUNITY
Start: 2022-04-27

## 2023-11-03 RX ORDER — ATORVASTATIN CALCIUM 20 MG/1
TABLET, FILM COATED ORAL
COMMUNITY
Start: 2022-05-09

## 2023-11-03 RX ORDER — CALCIUM CARBONATE/VITAMIN D3 600MG-5MCG
1 TABLET ORAL DAILY
COMMUNITY

## 2023-11-03 RX ORDER — METFORMIN HYDROCHLORIDE 500 MG/1
500 TABLET ORAL DAILY
COMMUNITY
Start: 2023-09-07

## 2023-11-03 RX ORDER — CLOTRIMAZOLE AND BETAMETHASONE DIPROPIONATE 10; .64 MG/G; MG/G
CREAM TOPICAL
COMMUNITY
Start: 2022-12-26

## 2023-11-03 RX ORDER — NAPROXEN SODIUM 220 MG
TABLET ORAL
COMMUNITY

## 2023-11-03 RX ORDER — OMEPRAZOLE 40 MG/1
CAPSULE, DELAYED RELEASE ORAL
COMMUNITY

## 2023-11-03 RX ORDER — CLONIDINE HYDROCHLORIDE 0.2 MG/1
0.2 TABLET ORAL DAILY
COMMUNITY

## 2023-11-03 RX ORDER — LIDOCAINE 50 MG/G
PATCH TOPICAL
COMMUNITY

## 2023-11-03 RX ORDER — HYDROCHLOROTHIAZIDE 12.5 MG/1
12.5 CAPSULE ORAL DAILY
COMMUNITY

## 2023-11-03 RX ORDER — HYDROCORTISONE 1 %
CREAM (GRAM) TOPICAL
COMMUNITY
Start: 2023-05-14

## 2023-11-03 RX ORDER — DICYCLOMINE HYDROCHLORIDE 20 MG/1
TABLET ORAL
COMMUNITY

## 2023-11-03 RX ORDER — FERROUS SULFATE 325(65) MG
325 TABLET, DELAYED RELEASE (ENTERIC COATED) ORAL
COMMUNITY
Start: 2009-08-30

## 2023-11-03 RX ORDER — DICYCLOMINE HYDROCHLORIDE 10 MG/1
10 CAPSULE ORAL EVERY 6 HOURS
COMMUNITY

## 2023-11-03 RX ORDER — DOCUSATE SODIUM 100 MG/1
100 CAPSULE, LIQUID FILLED ORAL 2 TIMES DAILY
COMMUNITY

## 2023-11-03 RX ORDER — IBUPROFEN 800 MG/1
1 TABLET ORAL 3 TIMES DAILY PRN
COMMUNITY

## 2023-11-03 RX ORDER — ONDANSETRON 4 MG/1
TABLET, FILM COATED ORAL
COMMUNITY

## 2023-11-03 RX ORDER — BENZONATATE 200 MG/1
CAPSULE ORAL
COMMUNITY
Start: 2022-12-06

## 2023-11-06 ENCOUNTER — EVALUATION (OUTPATIENT)
Dept: PHYSICAL THERAPY | Facility: CLINIC | Age: 53
End: 2023-11-06
Payer: COMMERCIAL

## 2023-11-06 DIAGNOSIS — M77.12 LATERAL EPICONDYLITIS OF LEFT ELBOW: Primary | ICD-10-CM

## 2023-11-06 PROCEDURE — 97161 PT EVAL LOW COMPLEX 20 MIN: CPT | Mod: GP

## 2023-11-06 PROCEDURE — 97110 THERAPEUTIC EXERCISES: CPT | Mod: GP

## 2023-11-06 ASSESSMENT — PAIN - FUNCTIONAL ASSESSMENT: PAIN_FUNCTIONAL_ASSESSMENT: 0-10

## 2023-11-06 ASSESSMENT — PAIN DESCRIPTION - DESCRIPTORS: DESCRIPTORS: ACHING

## 2023-11-06 ASSESSMENT — PAIN SCALES - GENERAL: PAINLEVEL_OUTOF10: 7

## 2023-11-06 NOTE — Clinical Note
November 6, 2023     Patient: Carla Grey   YOB: 1970   Date of Visit: 11/6/2023       To Whom it May Concern:    Carla Grey was seen in my clinic on 11/6/2023. She {Return to school/sport:71630}.    If you have any questions or concerns, please don't hesitate to call.         Sincerely,          Wilda Teixeira, PT        CC: No Recipients

## 2023-11-06 NOTE — Clinical Note
November 6, 2023     Patient: Carla Grey   YOB: 1970   Date of Visit: 11/6/2023       To Whom It May Concern:    It is my medical opinion that Carla Grey {Work release (duty restriction):61767}.    If you have any questions or concerns, please don't hesitate to call.         Sincerely,        Wilda Teixeira, PT    CC: No Recipients

## 2023-11-06 NOTE — PROGRESS NOTES
"Physical Therapy Evaluation    Patient Name: Carla Grey  MRN: [id number]    Current Problem  1. Lateral epicondylitis of left elbow  Follow Up In Physical Therapy        Insurance    Insurance reviewed   Visit number: 1  Approved number of visits: [facility]  [employer]  Visits [facility]  Requires auth after eval      Subjective   General: Patient is a 54 y/o F who is here today for c/o L elbow pain for approx 2-3 weeks. Pt denies specific onset/injury, reports that she woke up one morning and felt like \"someone hit her with a baseball bat.\" Pt found to have lateral epicondylitis of L elbow, also found to have B carpal tunnel and has been wearing night splints which has helped. Pt has been taking prednisone and prescription pain relievers, is trying not to take the pain pills. Pt feels that the prednisone is helping.     Precautions:  none  Pain:  6-7/10, L lateral elbow, aching  Pain Exacerbating Factors: lifting, carrying, repetitive tasks, ADLs/IADLs  Pain Relieving Factors: prednisone, OTC pain relievers, prescription pain relievers, ice, heat, L elbow compression brace    Reviewed medical screening form with pt and medical screening assessed    Imaging:   Unremarkable    Objective   Posture: forward head, rounded shoulders    Shoulder AROM (* indicates pain)  Flexion: 165 L* ; WNL R   ABD: 165 L* ; WNL R   Functional ER: WNL L* ; WNL R   Functional IR: WNL L* : WNL R     Shoulder MMT (* indicates pain)  Flex: 4/5 L* ; 4+/5 R   ABD: 4/5 L* ; 4+/5 R   IR: 4/5 L* ; 4+/5 R   ER: 4/5 L* ; 4+/5 R     Elbow AROM (* indicates pain)  Flexion: 125 L* ; 140 R  Extension: -15 L* ; 0 R  Supination: 60 L* ; 90 R  Pronation: 90 L* ; 90 R    Elbow MMT (* indicates pain)  Flexion: 4-/5 L* ; 4+/5 R  Extension: 4-/5 L* ; 4+/5 R  Supination: 4-/5 L* ; 4+/5 R  Pronation: 4-/5 L* ; 4+/5 R    Mill's Test: (+) L*  Maudsley's Test:  (+) L*    Palpation: L lateral epicondyle    Outcome Measures:  Other Measures  Other Outcome Measures: 65.91% " (QuickDASH)     Treatment: See HEP below    EDUCATION/HEP:  Access Code: [email or web address]  URL: [email or web address]  Date: 11/06/2023  Prepared by: Wilda Teixeira    Exercises  - Seated Elbow Flexion AAROM  - 1 x daily - 7 x weekly - 1-2 sets - 10 reps - 2-5 sec hold  - Standing Elbow Flexion Extension AROM  - 1 x daily - 7 x weekly - 1-2 sets - 10 reps - 2-5 sec hold  - Seated Wrist Supination Stretch  - 1 x daily - 7 x weekly - 1-2 sets - 10 reps - 2-5 sec hold  - Seated Wrist Flexion Stretch  - 1 x daily - 7 x weekly - 1-2 sets - 10 reps - 2-5 sec hold  - Seated Wrist Extension Stretch  - 1 x daily - 7 x weekly - 1-2 sets - 10 reps - 2-5 sec hold    Assessment:  Patient is a 54 y/o F who participated in PT evaluation this date for c/o L elbow pain d/t L lateral epicondylitis. Patient presents with L elbow pain, decreased L elbow and shoulder ROM, decreased BUE strength, and impaired posture. Due to these impairments, pt has increased difficulty with lifting, carrying, reaching, sleeping, driving, and performing ADLs/IADLs. Pt would benefit from PT services to decrease pain, increase ROM and strength, and improve posture to facilitate return to prior level of activities as able.     Problem List: activity limitations, ADLs/IADLs/self care skills, decreased functional level, decreased knowledge of HEP, decreased knowledge of precautions, flexibility, pain, participation restrictions, posture, range of motion/joint mobility and strength.    Clinical Presentation: Stable    Level of Complexity: Low    Goals:  Pt will report at least 75% improvement in L elbow pain during everyday activities.  Pt will demo >/= 4+/5 strength of BUE musculature without pain.  Pt will demo full and symmetrical A/PROM of L elbow and shoulder without pain.  Pt will improve Quick DASH score by at least 20% (MCID) to indicate a significant improvement in overall function. Baseline 11/6/23: 65.91%  Pt will demo independence  and report compliance with HEP.     Plan  1-2x/week for 8 visits     Planned interventions include: blood flow restriction, aquatic therapy, biofeedback, cryotherapy, dry needling, edema control, education/instruction, electrical stimulation, home program, hot pack, kinesiotaping, manual therapy, neuromuscular re-education, self care/home management, therapeutic activities, therapeutic exercises, ultrasound and vasopneumatic device w/ cold.

## 2023-11-14 ENCOUNTER — TELEPHONE (OUTPATIENT)
Dept: PHYSICAL THERAPY | Facility: CLINIC | Age: 53
End: 2023-11-14
Payer: COMMERCIAL

## 2023-12-04 ENCOUNTER — LAB (OUTPATIENT)
Dept: LAB | Facility: LAB | Age: 53
End: 2023-12-04
Payer: COMMERCIAL

## 2023-12-04 DIAGNOSIS — E11.9 TYPE 2 DIABETES MELLITUS WITHOUT COMPLICATIONS (MULTI): Primary | ICD-10-CM

## 2023-12-04 DIAGNOSIS — I10 ESSENTIAL (PRIMARY) HYPERTENSION: ICD-10-CM

## 2023-12-04 DIAGNOSIS — E11.9 TYPE 2 DIABETES MELLITUS WITHOUT COMPLICATIONS (MULTI): ICD-10-CM

## 2023-12-04 LAB
ALBUMIN SERPL BCP-MCNC: 4 G/DL (ref 3.4–5)
ALP SERPL-CCNC: 63 U/L (ref 33–110)
ALT SERPL W P-5'-P-CCNC: 16 U/L (ref 7–45)
ANION GAP SERPL CALC-SCNC: 11 MMOL/L (ref 10–20)
AST SERPL W P-5'-P-CCNC: 12 U/L (ref 9–39)
BILIRUB SERPL-MCNC: 0.4 MG/DL (ref 0–1.2)
BUN SERPL-MCNC: 18 MG/DL (ref 6–23)
CALCIUM SERPL-MCNC: 8.8 MG/DL (ref 8.6–10.3)
CHLORIDE SERPL-SCNC: 102 MMOL/L (ref 98–107)
CHOLEST SERPL-MCNC: 110 MG/DL (ref 0–199)
CHOLESTEROL/HDL RATIO: 3
CO2 SERPL-SCNC: 31 MMOL/L (ref 21–32)
CREAT SERPL-MCNC: 0.76 MG/DL (ref 0.5–1.05)
ERYTHROCYTE [DISTWIDTH] IN BLOOD BY AUTOMATED COUNT: 13 % (ref 11.5–14.5)
EST. AVERAGE GLUCOSE BLD GHB EST-MCNC: 120 MG/DL
GFR SERPL CREATININE-BSD FRML MDRD: >90 ML/MIN/1.73M*2
GLUCOSE SERPL-MCNC: 123 MG/DL (ref 74–99)
HBA1C MFR BLD: 5.8 %
HCT VFR BLD AUTO: 40.3 % (ref 36–46)
HDLC SERPL-MCNC: 37.1 MG/DL
HGB BLD-MCNC: 13.3 G/DL (ref 12–16)
LDLC SERPL CALC-MCNC: 49 MG/DL
MCH RBC QN AUTO: 31.1 PG (ref 26–34)
MCHC RBC AUTO-ENTMCNC: 33 G/DL (ref 32–36)
MCV RBC AUTO: 94 FL (ref 80–100)
NON HDL CHOLESTEROL: 73 MG/DL (ref 0–149)
NRBC BLD-RTO: 0 /100 WBCS (ref 0–0)
PLATELET # BLD AUTO: 261 X10*3/UL (ref 150–450)
POTASSIUM SERPL-SCNC: 4 MMOL/L (ref 3.5–5.3)
PROT SERPL-MCNC: 6.2 G/DL (ref 6.4–8.2)
RBC # BLD AUTO: 4.27 X10*6/UL (ref 4–5.2)
SODIUM SERPL-SCNC: 140 MMOL/L (ref 136–145)
TRIGL SERPL-MCNC: 118 MG/DL (ref 0–149)
VLDL: 24 MG/DL (ref 0–40)
WBC # BLD AUTO: 7.8 X10*3/UL (ref 4.4–11.3)

## 2023-12-04 PROCEDURE — 80061 LIPID PANEL: CPT

## 2023-12-04 PROCEDURE — 80053 COMPREHEN METABOLIC PANEL: CPT

## 2023-12-04 PROCEDURE — 85027 COMPLETE CBC AUTOMATED: CPT

## 2023-12-04 PROCEDURE — 36415 COLL VENOUS BLD VENIPUNCTURE: CPT

## 2023-12-04 PROCEDURE — 83036 HEMOGLOBIN GLYCOSYLATED A1C: CPT

## 2023-12-13 ENCOUNTER — OFFICE VISIT (OUTPATIENT)
Dept: ORTHOPEDIC SURGERY | Facility: CLINIC | Age: 53
End: 2023-12-13
Payer: COMMERCIAL

## 2023-12-13 DIAGNOSIS — M77.12 LATERAL EPICONDYLITIS OF LEFT ELBOW: ICD-10-CM

## 2023-12-13 DIAGNOSIS — G56.03 BILATERAL CARPAL TUNNEL SYNDROME: ICD-10-CM

## 2023-12-13 PROCEDURE — 99214 OFFICE O/P EST MOD 30 MIN: CPT | Performed by: ORTHOPAEDIC SURGERY

## 2023-12-13 PROCEDURE — 3048F LDL-C <100 MG/DL: CPT | Performed by: ORTHOPAEDIC SURGERY

## 2023-12-13 PROCEDURE — 3044F HG A1C LEVEL LT 7.0%: CPT | Performed by: ORTHOPAEDIC SURGERY

## 2023-12-13 RX ORDER — MELOXICAM 15 MG/1
15 TABLET ORAL DAILY
Qty: 30 TABLET | Refills: 2 | Status: SHIPPED | OUTPATIENT
Start: 2023-12-13 | End: 2024-03-12

## 2023-12-13 NOTE — PROGRESS NOTES
History: Carla is here for her left arm.  She has known lateral epicondylitis.  She also has a bit of carpal tunnel.  She got initial relief with the hand bracing but is now getting night numbness again.  She was only able to get into 2 therapy visits per the PT office schedule.  She did obtain a brace but was told not to use it.  She is taking ibuprofen for pain.    Past medical history: Multiple  Medications: Multiple  Allergies: No known drug allergies    Please refer to the intake H&P regarding the patient's review of systems, family history and social history as was done today    HEENT: Normal  Lungs: Clear to auscultation  Heart: RRR  Abdomen: Soft, nontender  Skin: clear  Extremity: She has full range of motion of both elbows.  She has pain over the left lateral condyle and extensor wad with stressing.  No numbness or tingling today as it bothers her more at night.  She has full flexion extension supination and pronation.  Good  strength.  Minimal thenar atrophy on either side.  Lower extremity exam is normal for strength, motion, stability and neurovascular assessment.    Radiographs: Prior x-rays were essentially negative.    Assessment: Continued left elbow lateral epicondylitis, carpal tunnel syndrome    Plan: She was only able to get into 2 therapy visits.  She was told to get rid of the brace after just buying it.  At this point she would prefer to try therapy at another facility.  We gave her a new prescription and she would also like to try some Mobic.  She will stop if it causes any stomach upset.  We are going to proceed with EMG testing as her numbness and tingling have persisted despite bracing.  We will see her back in 4 to 6 weeks to assess progress and go through results.  If not improved we can consider further imaging of the elbow as well.  All questions were answered today with the patient.    This note was generated with voice recognition software and may contain grammatical errors.

## 2023-12-25 ENCOUNTER — HOSPITAL ENCOUNTER (EMERGENCY)
Facility: HOSPITAL | Age: 53
Discharge: HOME | End: 2023-12-25
Payer: COMMERCIAL

## 2023-12-25 VITALS
HEART RATE: 87 BPM | OXYGEN SATURATION: 98 % | RESPIRATION RATE: 18 BRPM | HEIGHT: 65 IN | TEMPERATURE: 98.4 F | BODY MASS INDEX: 40.48 KG/M2 | SYSTOLIC BLOOD PRESSURE: 163 MMHG | DIASTOLIC BLOOD PRESSURE: 88 MMHG | WEIGHT: 243 LBS

## 2023-12-25 DIAGNOSIS — M79.602 PAIN OF LEFT UPPER EXTREMITY: ICD-10-CM

## 2023-12-25 DIAGNOSIS — L03.114 CELLULITIS OF LEFT UPPER ARM: ICD-10-CM

## 2023-12-25 DIAGNOSIS — R21 RASH AND OTHER NONSPECIFIC SKIN ERUPTION: Primary | ICD-10-CM

## 2023-12-25 PROCEDURE — 99283 EMERGENCY DEPT VISIT LOW MDM: CPT

## 2023-12-25 PROCEDURE — 2500000001 HC RX 250 WO HCPCS SELF ADMINISTERED DRUGS (ALT 637 FOR MEDICARE OP): Performed by: PHYSICIAN ASSISTANT

## 2023-12-25 RX ORDER — NAPROXEN 500 MG/1
500 TABLET ORAL
Qty: 30 TABLET | Refills: 0 | Status: SHIPPED | OUTPATIENT
Start: 2023-12-25 | End: 2024-01-09

## 2023-12-25 RX ORDER — DOXYCYCLINE 100 MG/1
100 CAPSULE ORAL 2 TIMES DAILY
Qty: 20 CAPSULE | Refills: 0 | Status: SHIPPED | OUTPATIENT
Start: 2023-12-25 | End: 2024-01-04

## 2023-12-25 RX ORDER — DOXYCYCLINE HYCLATE 100 MG
100 TABLET ORAL ONCE
Status: COMPLETED | OUTPATIENT
Start: 2023-12-25 | End: 2023-12-25

## 2023-12-25 RX ORDER — HYDROCODONE BITARTRATE AND ACETAMINOPHEN 5; 325 MG/1; MG/1
1 TABLET ORAL EVERY 6 HOURS PRN
Qty: 12 TABLET | Refills: 0 | Status: SHIPPED | OUTPATIENT
Start: 2023-12-25 | End: 2023-12-28

## 2023-12-25 RX ORDER — IBUPROFEN 600 MG/1
600 TABLET ORAL ONCE
Status: COMPLETED | OUTPATIENT
Start: 2023-12-25 | End: 2023-12-25

## 2023-12-25 RX ADMIN — IBUPROFEN 600 MG: 600 TABLET, FILM COATED ORAL at 21:20

## 2023-12-25 RX ADMIN — DOXYCYCLINE HYCLATE 100 MG: 100 TABLET, FILM COATED ORAL at 21:20

## 2023-12-25 ASSESSMENT — PAIN DESCRIPTION - ORIENTATION: ORIENTATION: LEFT

## 2023-12-25 ASSESSMENT — PAIN DESCRIPTION - LOCATION: LOCATION: ARM

## 2023-12-25 ASSESSMENT — PAIN DESCRIPTION - PAIN TYPE: TYPE: ACUTE PAIN

## 2023-12-25 ASSESSMENT — COLUMBIA-SUICIDE SEVERITY RATING SCALE - C-SSRS
2. HAVE YOU ACTUALLY HAD ANY THOUGHTS OF KILLING YOURSELF?: NO
6. HAVE YOU EVER DONE ANYTHING, STARTED TO DO ANYTHING, OR PREPARED TO DO ANYTHING TO END YOUR LIFE?: NO
1. IN THE PAST MONTH, HAVE YOU WISHED YOU WERE DEAD OR WISHED YOU COULD GO TO SLEEP AND NOT WAKE UP?: NO

## 2023-12-25 ASSESSMENT — PAIN SCALES - GENERAL: PAINLEVEL_OUTOF10: 8

## 2023-12-25 ASSESSMENT — LIFESTYLE VARIABLES
EVER FELT BAD OR GUILTY ABOUT YOUR DRINKING: NO
EVER HAD A DRINK FIRST THING IN THE MORNING TO STEADY YOUR NERVES TO GET RID OF A HANGOVER: NO
HAVE PEOPLE ANNOYED YOU BY CRITICIZING YOUR DRINKING: NO
HAVE YOU EVER FELT YOU SHOULD CUT DOWN ON YOUR DRINKING: NO

## 2023-12-25 ASSESSMENT — PAIN - FUNCTIONAL ASSESSMENT: PAIN_FUNCTIONAL_ASSESSMENT: 0-10

## 2023-12-25 ASSESSMENT — PAIN DESCRIPTION - DESCRIPTORS: DESCRIPTORS: BURNING

## 2023-12-26 NOTE — ED PROVIDER NOTES
"HPI   Chief Complaint   Patient presents with   • Rash     Rash x3 weeks, \"arm feels like its on fire\"       The patient is a very pleasant 53-year-old female who presents to the emergency department chief complaint of red rash to her left upper arm which started a couple days ago and now has become painful.  The patient states that she had a rash a week ago on the opposite arm which was itchy and it started to spread all over.  The patient was prescribed Claritin and a topical hydrocortisone cream which she has been taking and using as prescribed.  For the past 24 hours the patient noticed unusual sensation to her left upper arm which is unusual.  The patient states that this is not itchy but it is burning and the last time this happened she ended up having to have an incision and drainage over her  site.  Patient denies any fevers, chills, night sweats.  The burning is constant it is worse when you palpate the area.  She denies any falls or injuries.  She has not taken anything for her pain prior to arrival today.      History provided by:  Patient   used: No                        No data recorded                Patient History   No past medical history on file.  Past Surgical History:   Procedure Laterality Date   • MR HEAD ANGIO WO IV CONTRAST  2022    MR HEAD ANGIO WO IV CONTRAST 2022 [facility]   • MR NECK ANGIO WO IV CONTRAST  2022    MR NECK ANGIO WO IV CONTRAST 2022 [facility]   • OTHER SURGICAL HISTORY  2021     section   • OTHER SURGICAL HISTORY  2021    Incision & drainage   • OTHER SURGICAL HISTORY  2021    Tonsillectomy     Family History   Problem Relation Name Age of Onset   • No Known Problems Mother     • No Known Problems Father       Social History     Tobacco Use   • Smoking status: Not on file   • Smokeless tobacco: Not on file   Substance Use Topics   • Alcohol use: Not on file   • Drug use: Not on " file       Physical Exam   ED Triage Vitals [12/25/23 1940]   Temp Heart Rate Resp BP   36.9 °C (98.4 °F) 87 18 163/88      SpO2 Temp Source Heart Rate Source Patient Position   98 % Temporal Monitor Sitting      BP Location FiO2 (%)     Right arm --       Physical Exam  Vitals and nursing note reviewed.   Constitutional:       Appearance: Normal appearance. She is normal weight.   Musculoskeletal:         General: Tenderness present. Normal range of motion.      Right upper arm: Normal.      Left upper arm: Tenderness present.        Arms:       Comments: There is some erythema to the olecranon process with raised red bumps, there does not appear to be any joint effusion, there is also erythema to the left upper tricep region, there is a tattoo in place but is old.  There is some tenderness and warmth to the skin concerning for cellulitis without evidence of abscess at this time.   Skin:     General: Skin is warm and dry.      Findings: Erythema present.   Neurological:      General: No focal deficit present.      Mental Status: She is alert and oriented to person, place, and time. Mental status is at baseline.   Psychiatric:         Mood and Affect: Mood normal.         Behavior: Behavior normal.         Thought Content: Thought content normal.         Judgment: Judgment normal.         ED Course & MDM   Diagnoses as of 12/25/23 2035   Rash and other nonspecific skin eruption   Cellulitis of left upper arm   Pain of left upper extremity       Medical Decision Making  Temperature is 36.9 pulse 87 respirations 18 BP is 163/88 pulse ox is 98% on room air  The patient's exam does show concerning signs of cellulitis.  There is no obvious skin breakdown, however this could have been brought on from when she was scratching at the rash on her arms.  The patient was given a dose of doxycycline and ibuprofen in the ED.  She was discharged home with prescription for naproxen, doxycycline, hydrocodone and referred to the  wound care center for follow-up.  Patient was advised to keep the area clean and dry.  She was given chlorhexidine soap, she was advised to return with any concerns or worsening of symptoms all questions answered prior to discharge        Procedure  Procedures     Servando Mistry PA-C  12/25/23 2036

## 2024-01-04 ENCOUNTER — LAB (OUTPATIENT)
Dept: LAB | Facility: LAB | Age: 54
End: 2024-01-04
Payer: COMMERCIAL

## 2024-01-04 DIAGNOSIS — B35.1 TINEA UNGUIUM: Primary | ICD-10-CM

## 2024-01-04 LAB — ALT SERPL W P-5'-P-CCNC: 27 U/L (ref 7–45)

## 2024-01-04 PROCEDURE — 36415 COLL VENOUS BLD VENIPUNCTURE: CPT

## 2024-01-04 PROCEDURE — 84460 ALANINE AMINO (ALT) (SGPT): CPT

## 2024-01-24 ENCOUNTER — LAB (OUTPATIENT)
Dept: LAB | Facility: LAB | Age: 54
End: 2024-01-24
Payer: COMMERCIAL

## 2024-01-24 DIAGNOSIS — B35.1 TINEA UNGUIUM: Primary | ICD-10-CM

## 2024-01-24 LAB — ALT SERPL W P-5'-P-CCNC: 16 U/L (ref 7–45)

## 2024-01-24 PROCEDURE — 36415 COLL VENOUS BLD VENIPUNCTURE: CPT

## 2024-01-24 PROCEDURE — 84460 ALANINE AMINO (ALT) (SGPT): CPT

## 2024-01-26 ENCOUNTER — TELEPHONE (OUTPATIENT)
Dept: ORTHOPEDIC SURGERY | Facility: CLINIC | Age: 54
End: 2024-01-26
Payer: COMMERCIAL

## 2024-02-13 ENCOUNTER — APPOINTMENT (OUTPATIENT)
Dept: NEUROLOGY | Facility: HOSPITAL | Age: 54
End: 2024-02-13
Payer: COMMERCIAL

## 2024-02-28 ENCOUNTER — APPOINTMENT (OUTPATIENT)
Dept: ORTHOPEDIC SURGERY | Facility: CLINIC | Age: 54
End: 2024-02-28
Payer: COMMERCIAL

## 2024-04-10 ENCOUNTER — OFFICE VISIT (OUTPATIENT)
Dept: SURGERY | Facility: CLINIC | Age: 54
End: 2024-04-10
Payer: COMMERCIAL

## 2024-04-10 VITALS
BODY MASS INDEX: 43.82 KG/M2 | SYSTOLIC BLOOD PRESSURE: 148 MMHG | HEIGHT: 65 IN | WEIGHT: 263 LBS | HEART RATE: 68 BPM | DIASTOLIC BLOOD PRESSURE: 90 MMHG

## 2024-04-10 DIAGNOSIS — K43.9 VENTRAL HERNIA WITHOUT OBSTRUCTION OR GANGRENE: Primary | ICD-10-CM

## 2024-04-10 PROCEDURE — 3080F DIAST BP >= 90 MM HG: CPT | Performed by: SURGERY

## 2024-04-10 PROCEDURE — 3077F SYST BP >= 140 MM HG: CPT | Performed by: SURGERY

## 2024-04-10 PROCEDURE — 99213 OFFICE O/P EST LOW 20 MIN: CPT | Performed by: SURGERY

## 2024-04-19 NOTE — PROGRESS NOTES
Subjective   Patient ID: Carla Grey is a 54 y.o. female who presents for Follow-up (Patient wanted to check on how her hernia is doing.).  HPI  Remote laparoscopic cholecystectomy findings of umbilical hernia closed primarily at the time of that surgery. Patient has now undertaken an aggressive weight loss regimen with approximately 40 pound weight loss with diet modifications and increase physical activity, and now noting bulge at the umbilicus eccentric to the left. No pain. Spontaneously reducible with lying supine. No nausea or vomiting.    Working on weight loss and remains actively motivated but was frustrated after course of steroids for respiratory infection causing this process to stall.     BMI 43.7 today    Review of Systems      History reviewed. No pertinent past medical history.  Past Surgical History:   Procedure Laterality Date    CHOLECYSTECTOMY      HERNIA REPAIR      MR HEAD ANGIO WO IV CONTRAST  2022    MR HEAD ANGIO WO IV CONTRAST 2022 [facility]    MR NECK ANGIO WO IV CONTRAST  2022    MR NECK ANGIO WO IV CONTRAST 2022 [facility]    OTHER SURGICAL HISTORY  2021     section    OTHER SURGICAL HISTORY  2021    Incision & drainage    OTHER SURGICAL HISTORY  2021    Tonsillectomy     Family History   Problem Relation Name Age of Onset    No Known Problems Mother      No Known Problems Father        Social History     Socioeconomic History    Marital status:      Spouse name: None    Number of children: None    Years of education: None    Highest education level: None   Occupational History    None   Tobacco Use    Smoking status: Former     Types: Cigarettes    Smokeless tobacco: Never   Vaping Use    Vaping status: Never Used   Substance and Sexual Activity    Alcohol use: Never    Drug use: Never    Sexual activity: Defer   Other Topics Concern    None   Social History Narrative    None     Social Determinants of Health      Financial Resource Strain: Not on file   Food Insecurity: Not on file   Transportation Needs: Not on file   Physical Activity: Not on file   Stress: Not on file   Social Connections: Not on file   Intimate Partner Violence: Not on file   Housing Stability: Not on file          Current Outpatient Medications:     ALPRAZolam (Xanax) 1 mg tablet, Take 1 tablet (1 mg) by mouth as needed at bedtime., Disp: , Rfl:     aspirin 81 mg chewable tablet, aspirin 81 mg oral tablet, dispersible  Quantity: 0  Refills: 0  Ordered: 17-Oct-2019 Frankie Graham Generic Substitution Allowed, Disp: , Rfl:     atenolol (Tenormin) 25 mg tablet, Take 1 tablet (25 mg) by mouth 2 times a day., Disp: , Rfl:     atorvastatin (Lipitor) 40 mg tablet, Take 1 tablet (40 mg) by mouth once daily at bedtime., Disp: , Rfl:     calcium carbonate-vitamin D3 600 mg-5 mcg (200 unit) tablet, Take 1 tablet by mouth once daily., Disp: , Rfl:     cholecalciferol (Vitamin D-3) 25 MCG (1000 UT) tablet, Take 1 tablet (1,000 Units) by mouth once daily., Disp: , Rfl:     cloNIDine (Catapres) 0.3 mg tablet, Take 1 tablet (0.3 mg) by mouth once daily., Disp: , Rfl:     dicyclomine (Bentyl) 20 mg tablet, TAKE 1 TABLET BY MOUTH EVERY 6 HOURS AS NEEDED FOR ADBOMINAL CRAMPS, Disp: , Rfl:     docusate sodium (Colace) 100 mg capsule, Take 1 capsule (100 mg) by mouth twice a day., Disp: , Rfl:     EPINEPHrine (EpiPen 2-Nomna) 0.3 mg/0.3 mL injection syringe, Inject 0.3 mL (0.3 mg) into the muscle 1 time if needed., Disp: , Rfl:     hydroCHLOROthiazide (Microzide) 12.5 mg capsule, Take 1 capsule (12.5 mg) by mouth once daily., Disp: , Rfl:     ibuprofen 800 mg tablet, Take 1 tablet (800 mg) by mouth 3 times a day as needed., Disp: , Rfl:     L. acidophilus/Bifid. animalis 15.5 billion cell capsule, 1 cap(s) orally once a day, Disp: , Rfl:     lidocaine (Lidoderm) 5 % patch, APPLY 1 PATCH TO THE SKIN EVERY DAY AND WEAR FOR 12 HOURS AS NEEDED FOR BACK PAIN, Disp: , Rfl:      metFORMIN (Glucophage) 500 mg tablet, Take 1 tablet (500 mg) by mouth once daily. Take with food., Disp: , Rfl:     Mucus Relief  mg 12 hr tablet, Take 1 tablet (600 mg) by mouth every 12 hours if needed for congestion., Disp: , Rfl:     naproxen sodium (Aleve) 220 mg tablet, Aleve TABS  Refills: 0     Active, Disp: , Rfl:     omeprazole (PriLOSEC) 40 mg DR capsule, Take by mouth., Disp: , Rfl:     ondansetron ODT (Zofran-ODT) 4 mg disintegrating tablet, DISSOLVE ONE TABLET IN MOUTH EVERY 6 HOURS AS NEEDED FOR NAUSEA AND VOMITING, Disp: , Rfl:     tiZANidine (Zanaflex) 4 mg tablet, TAKE ONE TABLET BY MOUTH EVERY 8 HOURS AS NEEDED FOR MUSCLE SPASM, Disp: , Rfl:     ALPRAZolam (Xanax) 0.5 mg tablet, Take 1 tablet (0.5 mg) by mouth once daily as needed., Disp: , Rfl:     atorvastatin (Lipitor) 10 mg tablet, Take 1 tablet (10 mg) by mouth., Disp: , Rfl:     atorvastatin (Lipitor) 20 mg tablet, TAKE ONE TABLET BY MOUTH EVERY EVENING AFTER SUPPER, Disp: , Rfl:     benzonatate (Tessalon) 200 mg capsule, TAKE 1 CAPSULE BY MOUTH EVERY 12 HOURS AS NEEDED FOR COUGH, Disp: , Rfl:     cloNIDine (Catapres) 0.2 mg tablet, Take 1 tablet (0.2 mg) by mouth once daily., Disp: , Rfl:     clotrimazole-betamethasone (Lotrisone) cream, APPLY TO THE AFFECTED SKIN AREA TWICE A DAY, Disp: , Rfl:     cyanocobalamin (Vitamin B-12) 1,000 mcg/mL injection, INJECT 1 ML INTO THE MUSCLE EVERY MONTH, Disp: , Rfl:     dicyclomine (Bentyl) 10 mg capsule, Take 1 capsule (10 mg) by mouth every 6 hours., Disp: , Rfl:     doxycycline (Monodox) 100 mg capsule, Take 1 capsule (100 mg) by mouth 2 times a day., Disp: , Rfl:     ferrous sulfate 325 (65 Fe) MG EC tablet, Take 1 tablet by mouth 2 times a day with meals., Disp: , Rfl:     hydrocortisone 1 % cream, APPLY TO AFFECTED AREA AS NEEDED FOR ITCHING, Disp: , Rfl:     loratadine (Claritin) 10 mg tablet, Take 1 tablet (10 mg) by mouth once daily., Disp: , Rfl:     meloxicam (Mobic) 15 mg tablet, Take  1 tablet (15 mg) by mouth once daily., Disp: , Rfl:     omeprazole (PriLOSEC) 20 mg DR capsule, Take 1 capsule (20 mg) by mouth once daily. Take (1) capsule by mouth once a day before a meal., Disp: , Rfl:     ondansetron (Zofran) 4 mg tablet, 1 tab(s) orally every 6 hours, As Needed, Disp: , Rfl:     predniSONE (Deltasone) 20 mg tablet, Day 1-3: 1 pill, three times a day Day: 4-6: 1 pill, two times a day Day: 7-9: 1 pill, once a day (Patient not taking: Reported on 11/1/2023), Disp: 18 tablet, Rfl: 0    prenatal vit/iron fum/folic ac (PRENATAL VIT-FE FUMARATE-FA PO), Take 1 tablet by mouth once daily., Disp: , Rfl:     SODIUM BORATE, BULK, MISC, Take by mouth once daily., Disp: , Rfl:     sucralfate (Carafate) 1 gram tablet, Take 1 tablet (1 g) by mouth 3 times a day., Disp: , Rfl:     terconazole (Terazol 7) 0.4 % vaginal cream, USE AS DIRECTED, Disp: , Rfl:     triamcinolone (Kenalog) 0.5 % cream, APPLY TO AFFECTED SKIN TWICE DAILY, Disp: , Rfl:      Objective   Vitals:    04/10/24 1609   BP: 148/90   Pulse: 68      Physical Exam  Constitutional - General appearance: In no acute distress, well appearing and well nourished.   Pulmonary - Respiratory effort: Normal respiration.   Abdomen - Abdomen: Non-tender, no abdominal masses.~Well-healed infraumbilical incision with focal ventral hernia eccentric to left, spontaneously reduced suspected 1 to 2 cm defect although limited by body habitus.     Assessment/Plan   Problem List Items Addressed This Visit    None  Visit Diagnoses         Codes    Ventral hernia without obstruction or gangrene    -  Primary K43.9            Reassured regarding the presence of asymptomatic umbilical ventral hernia. Would pursue elective repair with BMI under 35. Use of mesh discussed. Potential need for more urgent intervention in the setting of incarceration also discussed. Patient remains extremely motivated in her weight loss goals. She will continue to pursue this course without any  activity restrictions from my standpoint. Follow-up in 6 months for reevaluation    Taylor Huang MD

## 2024-05-17 ENCOUNTER — APPOINTMENT (OUTPATIENT)
Dept: RADIOLOGY | Facility: HOSPITAL | Age: 54
End: 2024-05-17
Payer: COMMERCIAL

## 2024-05-17 ENCOUNTER — HOSPITAL ENCOUNTER (EMERGENCY)
Facility: HOSPITAL | Age: 54
Discharge: HOME | End: 2024-05-17
Payer: COMMERCIAL

## 2024-05-17 VITALS
HEART RATE: 67 BPM | SYSTOLIC BLOOD PRESSURE: 172 MMHG | OXYGEN SATURATION: 95 % | DIASTOLIC BLOOD PRESSURE: 85 MMHG | BODY MASS INDEX: 43.32 KG/M2 | WEIGHT: 260 LBS | HEIGHT: 65 IN | RESPIRATION RATE: 18 BRPM | TEMPERATURE: 97.2 F

## 2024-05-17 DIAGNOSIS — S76.912A MUSCLE STRAIN OF LEFT THIGH, INITIAL ENCOUNTER: Primary | ICD-10-CM

## 2024-05-17 PROCEDURE — 93971 EXTREMITY STUDY: CPT

## 2024-05-17 PROCEDURE — 93971 EXTREMITY STUDY: CPT | Performed by: RADIOLOGY

## 2024-05-17 PROCEDURE — 73552 X-RAY EXAM OF FEMUR 2/>: CPT | Mod: LT

## 2024-05-17 PROCEDURE — 96372 THER/PROPH/DIAG INJ SC/IM: CPT

## 2024-05-17 PROCEDURE — 99284 EMERGENCY DEPT VISIT MOD MDM: CPT | Mod: 25

## 2024-05-17 PROCEDURE — 73552 X-RAY EXAM OF FEMUR 2/>: CPT | Mod: LEFT SIDE | Performed by: RADIOLOGY

## 2024-05-17 PROCEDURE — 2500000004 HC RX 250 GENERAL PHARMACY W/ HCPCS (ALT 636 FOR OP/ED)

## 2024-05-17 RX ORDER — KETOROLAC TROMETHAMINE 30 MG/ML
30 INJECTION, SOLUTION INTRAMUSCULAR; INTRAVENOUS ONCE
Status: COMPLETED | OUTPATIENT
Start: 2024-05-17 | End: 2024-05-17

## 2024-05-17 RX ADMIN — KETOROLAC TROMETHAMINE 30 MG: 30 INJECTION, SOLUTION INTRAMUSCULAR at 15:07

## 2024-05-17 ASSESSMENT — LIFESTYLE VARIABLES
EVER HAD A DRINK FIRST THING IN THE MORNING TO STEADY YOUR NERVES TO GET RID OF A HANGOVER: NO
HAVE YOU EVER FELT YOU SHOULD CUT DOWN ON YOUR DRINKING: NO
HAVE PEOPLE ANNOYED YOU BY CRITICIZING YOUR DRINKING: NO
EVER FELT BAD OR GUILTY ABOUT YOUR DRINKING: NO
TOTAL SCORE: 0

## 2024-05-17 ASSESSMENT — PAIN SCALES - GENERAL
PAINLEVEL_OUTOF10: 4
PAINLEVEL_OUTOF10: 7

## 2024-05-17 ASSESSMENT — PAIN - FUNCTIONAL ASSESSMENT
PAIN_FUNCTIONAL_ASSESSMENT: 0-10
PAIN_FUNCTIONAL_ASSESSMENT: 0-10

## 2024-05-17 ASSESSMENT — PAIN DESCRIPTION - PROGRESSION: CLINICAL_PROGRESSION: GRADUALLY IMPROVING

## 2024-05-17 NOTE — ED PROVIDER NOTES
"HPI   Chief Complaint   Patient presents with    Leg Pain     'Here for left upper outer leg pain that has been going on for a few days.\"  \"Want to make sure does not have a blood clot.\"       History provided by: Patient    Limitations to history: None    CC: Thigh pain    HPI: 54-year-old female presents the emergency department to be evaluated for left thigh pain.  Patient has been having this pain for the last 2 to 3 days.  She characterized the pain as \"aching, like I pulled something \".  He localizes it to the anterior and lateral thigh.  She denies any injury that she can think of including falling, heavy lifting, new exercising.  She is able to bear weight without difficulty.  Denies numbness tingling or weakness in the extremity.  She does have a history of lower back pain but denies having low back pain currently.  Denies saddle anesthesia, urinary tension, stool incontinence.  Denies any swelling, redness, warmth or bruising of the thigh.  She has been taking ibuprofen, Tylenol, lidocaine patches and using topical IcyHot that have been helping somewhat with the discomfort.  She googled her symptoms online and saw that it could be the result of a blood clot so she wanted to come to the emergency department.  She denies it being similar to spasms or cramping in nature similar to a charley horse.  She tried contacting her PCP who is not available until later next week.  She denies history of blood clots and denies recent plane flights, recent surgeries, use of estrogen, or history of cancer.  She states that she does live a relatively sedentary life and drives long distances for living.  Denies chest pain, shortness of breath, pleuritic pain or hemoptysis.  Denies all GI  complaints.  Denies all other systemic symptoms.    ROS: Negative unless mentioned in HPI    Social Hx: Former smoker.  Denies alcohol and drug use.    Medical Hx: Medical history significant for hyperlipidemia.  Allergy to penicillins, " sulfa drugs, bee venom.  Immunizations up-to-date.    Physical exam:    Constitutional: Patient is well-nourished and well-developed.  Sitting comfortably in the room and in no distress.  Oriented to person, place, time, and situation.    HEENT: Head is normocephalic, atraumatic. Patient's airway is patent.  Tympanic membranes are clear bilaterally.  Nasal mucosa clear.  Mouth with normal mucosa.  Throat is not erythematous and there are no oropharyngeal exudates, uvula is midline.  No obvious facial deformities.    Eyes: Clear bilaterally.  Pupils are equal round and reactive to light and accommodation.  Extraocular movements intact.      Cardiac: Regular rate, regular rhythm.  Heart sounds S1, S2.  No murmurs, rubs, or gallops.  PMI nondisplaced.  No JVD.    Respiratory: Regular respiratory rate and effort.  Breath sounds are clear and equal bilaterally, no adventitious lung sounds.  Patient is speaking in full sentences and is in no apparent respiratory distress. No use of accessory muscles.      Gastrointestinal: Abdomen is soft, nondistended, and nontender.  There are no obvious deformities.  No rebound tenderness or guarding.  Bowel sounds are normal active.    Genitourinary: No CVA or flank tenderness.    Musculoskeletal: Patient has muscular discomfort over the anterior and lateral thigh.  No bony tenderness over the femur.  She is able to bear weight without changes in her gait.  There is no swelling warmth bruising.  No calf tenderness.  Negative Homans' sign.  No obvious skin or bony deformities.  Patient has equal range of motion in all extremities and no strength deficiencies. No back or neck tenderness.  Capillary refill less than 3 seconds.  Strong peripheral pulses.  No sensory deficits.    Neurological: Patient is alert and oriented.  No focal deficits.  5/5 strength in all extremities.  Cranial nerves II through XII intact. GCS15.     Skin: Skin is normal color for race and is warm, dry, and  intact.  No evidence of trauma.  No lesions, rashes, bruising, jaundice, or masses.    Psych: Appropriate mood and affect.  No apparent risk to self or others.    Heme/lymph: No adenopathy, lymphadenopathy, or splenomegaly    Physical exam is otherwise negative unless stated above or in history of present illness.                          Alba Coma Scale Score: 15                     Patient History   History reviewed. No pertinent past medical history.  Past Surgical History:   Procedure Laterality Date    CHOLECYSTECTOMY      HERNIA REPAIR      MR HEAD ANGIO WO IV CONTRAST  2022    MR HEAD ANGIO WO IV CONTRAST 2022 [facility]    MR NECK ANGIO WO IV CONTRAST  2022    MR NECK ANGIO WO IV CONTRAST 2022 [facility]    OTHER SURGICAL HISTORY  2021     section    OTHER SURGICAL HISTORY  2021    Incision & drainage    OTHER SURGICAL HISTORY  2021    Tonsillectomy     Family History   Problem Relation Name Age of Onset    No Known Problems Mother      No Known Problems Father       Social History     Tobacco Use    Smoking status: Former     Types: Cigarettes    Smokeless tobacco: Never   Vaping Use    Vaping status: Never Used   Substance Use Topics    Alcohol use: Never    Drug use: Never       Physical Exam   ED Triage Vitals [24 1448]   Temperature Heart Rate Respirations BP   36.2 °C (97.2 °F) 67 18 172/85      Pulse Ox Temp Source Heart Rate Source Patient Position   95 % Temporal Monitor Sitting      BP Location FiO2 (%)     Right arm --       Physical Exam    ED Course & MDM   Diagnoses as of 24 1700   Muscle strain of left thigh, initial encounter     Patient updated on plan for lab testing, IV insertion, radiology imaging, and medications to be administered while in the ER (if indicated). Patient updated on expected wait times for testing and results. Patient provided my name and told to ask any staff member for questions or  concerns if they should arise. Electronic medical record reviewed.     MDM    Upon initial assessment, patient was healthy non-toxic appearing and in no apparent distress.     Patient presented to the emergency department with the chief complaint left thigh pain.  Patient has muscular discomfort over the anterior and lateral thigh.  No bony tenderness over the femur.  She is able to bear weight without changes in her gait.  There is no swelling warmth bruising.  No calf tenderness.  Negative Homans' sign.  No obvious skin or bony deformities.  Patient has equal range of motion in all extremities and no strength deficiencies. No back or neck tenderness.  Capillary refill less than 3 seconds.  Strong peripheral pulses.  No sensory deficits.On arrival to the emergency department, vital signs were within normal limits    There are no history physical exam findings that would suggest arterial occlusion, PAD, infected or septic joint.  Low suspicion for bony abnormality given no bony tenderness and no traumatic injury.  However will get an x-ray to better evaluate for a mass.  Will also get a duplex ultrasound to rule out a DVT.  Will give the patient IM Toradol for her discomfort.    Ultrasound reveals no DVT.  X-ray shows substantial degenerative disease in the hip and knee.  Patient has a bio lab lytic bone lesion with sclerotic rim and well-defined margins in the distal medial femoral diaphysis, likely a fibroma.  Patient will be given an Ace wrap, states that she is feeling better after the Toradol.  Recommend that she continue taking ibuprofen and Tylenol as needed.  Discussed RICE treatment.  She will follow-up with orthopedics given her x-ray results.  All questions and concerns addressed.  Reasons to return to ER discussed.  Patient verbalized understanding and agreement with the treatment plan and they remained hemodynamically stable in the ER.        This note was dictated using a speech recognition program.   While an attempt was made at proof-reading to minimize errors, minor errors in transcription may be present    Medical Decision Making      Procedure  Procedures     Collin Sousa PA-C  05/17/24 [id number]

## 2024-05-28 ENCOUNTER — OFFICE VISIT (OUTPATIENT)
Dept: ORTHOPEDIC SURGERY | Facility: CLINIC | Age: 54
End: 2024-05-28
Payer: COMMERCIAL

## 2024-05-28 DIAGNOSIS — M89.9 BONE LESION: ICD-10-CM

## 2024-05-28 PROCEDURE — 99213 OFFICE O/P EST LOW 20 MIN: CPT | Performed by: STUDENT IN AN ORGANIZED HEALTH CARE EDUCATION/TRAINING PROGRAM

## 2024-05-28 PROCEDURE — 99214 OFFICE O/P EST MOD 30 MIN: CPT | Performed by: STUDENT IN AN ORGANIZED HEALTH CARE EDUCATION/TRAINING PROGRAM

## 2024-05-28 NOTE — PROGRESS NOTES
Chief Complaint   Patient presents with    Left Thigh - New Patient Visit, Pain     Left femur pain xrays @        HPI  54-year-old female presents today for evaluation of her left femur.  Patient was recently seen at  facility and was found to have a distal femur lesion.  States that regarding the pain this has 100% resolved however due to findings of incidental distal femur lesion she was recommended to follow-up with orthopedics.  Patient has no pain at all at this point in time no pain with any range of motion of the hip or knee foot or ankle.  No history of cancer.    No past medical history on file.    Past Surgical History:   Procedure Laterality Date    CHOLECYSTECTOMY      HERNIA REPAIR      MR HEAD ANGIO WO IV CONTRAST  2022    MR HEAD ANGIO WO IV CONTRAST 2022 [facility]    MR NECK ANGIO WO IV CONTRAST  2022    MR NECK ANGIO WO IV CONTRAST 2022 [facility]    OTHER SURGICAL HISTORY  2021     section    OTHER SURGICAL HISTORY  2021    Incision & drainage    OTHER SURGICAL HISTORY  2021    Tonsillectomy        Allergies   Allergen Reactions    Bee Venom Protein (Honey Bee) Anaphylaxis    Penicillins Anaphylaxis and Hives    Sulfa (Sulfonamide Antibiotics) Hives    Sulfamethoxazole-Trimethoprim Other        Physical exam    General: Alert and oriented to place, person, and time.  No acute distress and breathing comfortably; pleasant and cooperative with the examination.  HEENT: Head is normocephalic and atraumatic.  Neck: Supple, no visible swelling.  Cardiovascular: Good perfusion to the affected extremity.  Lungs: No audible wheezing or labored breathing.  Abdomen: Nondistended  HEME/Lymph : No visible abnormalities bilateral lower extremity    Extremity:  Left femur/knee:  Skin healthy and intact  No gross swelling or ecchymosis  No varus malalignment  No valgus malalignment   No effusion  ROM:  Full flexion   Full extension  No  pain with internal rotation of the hip     No tenderness to palpation over medial joint line  No tenderness to palpation over lateral joint line  No laxity to valgus stress  No laxity to varus stress  Negative Lachman´s test  Negative anterior drawer test  Negative posterior drawer test  Negative Mark´s test     Neurovascular exam normal distally    Diagnostics:  Lower extremity venous duplex left    Result Date: 5/17/2024  Interpreted By:  Debra Frots, STUDY: [facility] LOWER EXTREMITY VENOUS DUPLEX LEFT  5/17/2024 4:04 pm   INDICATION: 53 y/o   F with  Signs/Symptoms:nontraumatic thigh pain. LMP: Unknown.   COMPARISON: None.   ACCESSION NUMBER(S): [id number]   ORDERING CLINICIAN: HERMINIA RODRIGUEZ   TECHNIQUE: Routine ultrasound of the  left lower extremity was performed with duplex Doppler (color and spectral) evaluation.   Static images were obtained for remote interpretation.   FINDINGS: THIGH VEINS:  The common femoral, femoral, popliteal, proximal medial saphenous, and deep femoral veins are patent and free of thrombus. The veins are normally compressible.  They demonstrate normal phasic flow and augmentation response.   CALF VEINS:  The paired peroneal and posterior tibial calf veins are patent.       No deep venous thrombosis of the  left lower extremity.   MACRO: None   Signed by: Debra Frost 5/17/2024 4:31 PM Dictation workstation:   [id number]    XR femur left 2+ views    Result Date: 5/17/2024  Interpreted By:  Maynor Shah, STUDY: XR FEMUR LEFT 2+ VIEWS;  5/17/2024 3:33 pm   INDICATION: Signs/Symptoms:nontraumatic injury, r/o mass.   COMPARISON: None.   ACCESSION NUMBER(S): [id number]   ORDERING CLINICIAN: HERMINIA RODRIGUEZ   TECHNIQUE: AP and lateral views  of the  left femur were obtained.   FINDINGS: Slight left hip spur formation with left hip joint space preservation. Bilobed lytic bone lesion with sclerotic rim in the medial distal femoral diaphysis measuring 24 mm longitudinally by 9 mm  transversely moderate patellofemoral joint space narrowing with spur formation. No suprapatellar effusion. Mild-to-moderate medial and lateral compartment joint space narrowing with spur formation in the left knee.. No acute fracture or dislocation. No opaque soft tissue foreign body. No periosteal reaction or erosion.       Very mild DJD in the left hip. Moderate DJD in the left knee.   Bilobed lytic bone lesion with sclerotic rim and well-defined margins in the distal medial femoral diaphysis as described. Suspect nonossifying fibroma; enchondroma is less likely. Bone cyst is a remote possibility. Please note that this is a nonaggressive lesion based on radiographic appearance.   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  Will obtain MRI of the femur with and without contrast to ensure no other lesion.  If this is benign appearing on x-rays we will likely proceed with serial monitoring.  In the meantime range of motion as tolerated activities as tolerated using pain as a guide this patient does appear to be benign in nature it is eccentric metaphyseal lobular well marginated without soft tissue reaction.  However likely proceed with monitoring over the course of 3 to 6 months time.  Discussed activities to avoid as well as importance of using pain as a guide.  All of the patient's questions were answered.

## 2024-06-25 ENCOUNTER — LAB (OUTPATIENT)
Dept: LAB | Facility: LAB | Age: 54
End: 2024-06-25
Payer: COMMERCIAL

## 2024-06-25 DIAGNOSIS — I10 ESSENTIAL (PRIMARY) HYPERTENSION: ICD-10-CM

## 2024-06-25 DIAGNOSIS — R73.9 PRE-MEAL BLOOD GLUCOSE BETWEEN 8.0 AND 11.9 MMOL/L: Primary | ICD-10-CM

## 2024-06-25 DIAGNOSIS — E78.00 PURE HYPERCHOLESTEROLEMIA: ICD-10-CM

## 2024-06-25 LAB
ALBUMIN SERPL BCP-MCNC: 4.1 G/DL (ref 3.4–5)
ALP SERPL-CCNC: 72 U/L (ref 33–110)
ALT SERPL W P-5'-P-CCNC: 15 U/L (ref 7–45)
ANION GAP SERPL CALC-SCNC: 13 MMOL/L (ref 10–20)
AST SERPL W P-5'-P-CCNC: 14 U/L (ref 9–39)
BASOPHILS # BLD AUTO: 0.02 X10*3/UL (ref 0–0.1)
BASOPHILS NFR BLD AUTO: 0.3 %
BILIRUB SERPL-MCNC: 0.6 MG/DL (ref 0–1.2)
BUN SERPL-MCNC: 15 MG/DL (ref 6–23)
CALCIUM SERPL-MCNC: 9 MG/DL (ref 8.6–10.3)
CHLORIDE SERPL-SCNC: 105 MMOL/L (ref 98–107)
CHOLEST SERPL-MCNC: 124 MG/DL (ref 0–199)
CHOLESTEROL/HDL RATIO: 3.8
CO2 SERPL-SCNC: 25 MMOL/L (ref 21–32)
CREAT SERPL-MCNC: 0.78 MG/DL (ref 0.5–1.05)
EGFRCR SERPLBLD CKD-EPI 2021: >90 ML/MIN/1.73M*2
EOSINOPHIL # BLD AUTO: 0.12 X10*3/UL (ref 0–0.7)
EOSINOPHIL NFR BLD AUTO: 1.9 %
ERYTHROCYTE [DISTWIDTH] IN BLOOD BY AUTOMATED COUNT: 13.1 % (ref 11.5–14.5)
EST. AVERAGE GLUCOSE BLD GHB EST-MCNC: 120 MG/DL
GLUCOSE SERPL-MCNC: 127 MG/DL (ref 74–99)
HBA1C MFR BLD: 5.8 %
HCT VFR BLD AUTO: 43.7 % (ref 36–46)
HDLC SERPL-MCNC: 32.5 MG/DL
HGB BLD-MCNC: 14.2 G/DL (ref 12–16)
IMM GRANULOCYTES # BLD AUTO: 0.02 X10*3/UL (ref 0–0.7)
IMM GRANULOCYTES NFR BLD AUTO: 0.3 % (ref 0–0.9)
LDLC SERPL CALC-MCNC: 72 MG/DL
LYMPHOCYTES # BLD AUTO: 1.47 X10*3/UL (ref 1.2–4.8)
LYMPHOCYTES NFR BLD AUTO: 23.7 %
MCH RBC QN AUTO: 31.3 PG (ref 26–34)
MCHC RBC AUTO-ENTMCNC: 32.5 G/DL (ref 32–36)
MCV RBC AUTO: 96 FL (ref 80–100)
MONOCYTES # BLD AUTO: 0.42 X10*3/UL (ref 0.1–1)
MONOCYTES NFR BLD AUTO: 6.8 %
NEUTROPHILS # BLD AUTO: 4.15 X10*3/UL (ref 1.2–7.7)
NEUTROPHILS NFR BLD AUTO: 67 %
NON HDL CHOLESTEROL: 92 MG/DL (ref 0–149)
NRBC BLD-RTO: 0 /100 WBCS (ref 0–0)
PLATELET # BLD AUTO: 226 X10*3/UL (ref 150–450)
POTASSIUM SERPL-SCNC: 4.4 MMOL/L (ref 3.5–5.3)
PROT SERPL-MCNC: 6.7 G/DL (ref 6.4–8.2)
RBC # BLD AUTO: 4.54 X10*6/UL (ref 4–5.2)
SODIUM SERPL-SCNC: 139 MMOL/L (ref 136–145)
TRIGL SERPL-MCNC: 97 MG/DL (ref 0–149)
VLDL: 19 MG/DL (ref 0–40)
WBC # BLD AUTO: 6.2 X10*3/UL (ref 4.4–11.3)

## 2024-06-25 PROCEDURE — 80061 LIPID PANEL: CPT

## 2024-06-25 PROCEDURE — 85025 COMPLETE CBC W/AUTO DIFF WBC: CPT

## 2024-06-25 PROCEDURE — 83036 HEMOGLOBIN GLYCOSYLATED A1C: CPT

## 2024-06-25 PROCEDURE — 80053 COMPREHEN METABOLIC PANEL: CPT

## 2024-06-25 PROCEDURE — 36415 COLL VENOUS BLD VENIPUNCTURE: CPT

## 2024-07-01 ENCOUNTER — APPOINTMENT (OUTPATIENT)
Dept: RADIOLOGY | Facility: HOSPITAL | Age: 54
End: 2024-07-01
Payer: COMMERCIAL

## 2024-07-10 ENCOUNTER — HOSPITAL ENCOUNTER (OUTPATIENT)
Dept: RADIOLOGY | Facility: HOSPITAL | Age: 54
Discharge: HOME | End: 2024-07-10
Payer: COMMERCIAL

## 2024-07-10 DIAGNOSIS — M89.9 BONE LESION: ICD-10-CM

## 2024-07-10 PROCEDURE — 73720 MRI LWR EXTREMITY W/O&W/DYE: CPT | Mod: LT

## 2024-07-10 PROCEDURE — A9575 INJ GADOTERATE MEGLUMI 0.1ML: HCPCS | Performed by: STUDENT IN AN ORGANIZED HEALTH CARE EDUCATION/TRAINING PROGRAM

## 2024-07-10 PROCEDURE — 2550000001 HC RX 255 CONTRASTS: Performed by: STUDENT IN AN ORGANIZED HEALTH CARE EDUCATION/TRAINING PROGRAM

## 2024-07-10 RX ORDER — GADOTERATE MEGLUMINE 376.9 MG/ML
0.2 INJECTION INTRAVENOUS
Status: COMPLETED | OUTPATIENT
Start: 2024-07-10 | End: 2024-07-10

## 2024-07-19 ENCOUNTER — OFFICE VISIT (OUTPATIENT)
Dept: ORTHOPEDIC SURGERY | Facility: CLINIC | Age: 54
End: 2024-07-19
Payer: COMMERCIAL

## 2024-07-19 DIAGNOSIS — M89.9 BONE LESION: Primary | ICD-10-CM

## 2024-07-19 PROCEDURE — 3044F HG A1C LEVEL LT 7.0%: CPT | Performed by: STUDENT IN AN ORGANIZED HEALTH CARE EDUCATION/TRAINING PROGRAM

## 2024-07-19 PROCEDURE — 99213 OFFICE O/P EST LOW 20 MIN: CPT | Performed by: STUDENT IN AN ORGANIZED HEALTH CARE EDUCATION/TRAINING PROGRAM

## 2024-07-19 PROCEDURE — 3048F LDL-C <100 MG/DL: CPT | Performed by: STUDENT IN AN ORGANIZED HEALTH CARE EDUCATION/TRAINING PROGRAM

## 2024-07-21 NOTE — PROGRESS NOTES
Chief Complaint   Patient presents with    Left Thigh - Pain     Mri review          HPI  54-year-old female presents today for evaluation of her left femur.  Patient was recently seen at  facility and was found to have a distal femur lesion.  States that regarding the pain this has 100% resolved however due to findings of incidental distal femur lesion she was recommended to follow-up with orthopedics.  Patient has no pain at all at this point in time no pain with any range of motion of the hip or knee foot or ankle.  No history of cancer.    Presents today for discussion of left femur MRI results.  Continues to have no pain at all.  Ambulate independently without issue.    History reviewed. No pertinent past medical history.    Past Surgical History:   Procedure Laterality Date    CHOLECYSTECTOMY      HERNIA REPAIR      MR HEAD ANGIO WO IV CONTRAST  2022    MR HEAD ANGIO WO IV CONTRAST 2022 [facility]    MR NECK ANGIO WO IV CONTRAST  2022    MR NECK ANGIO WO IV CONTRAST 2022 [facility]    OTHER SURGICAL HISTORY  2021     section    OTHER SURGICAL HISTORY  2021    Incision & drainage    OTHER SURGICAL HISTORY  2021    Tonsillectomy        Allergies   Allergen Reactions    Bee Venom Protein (Honey Bee) Anaphylaxis    Penicillins Anaphylaxis and Hives    Sulfa (Sulfonamide Antibiotics) Hives    Sulfamethoxazole-Trimethoprim Other        Physical exam    General: Alert and oriented to place, person, and time.  No acute distress and breathing comfortably; pleasant and cooperative with the examination.  HEENT: Head is normocephalic and atraumatic.  Neck: Supple, no visible swelling.  Cardiovascular: Good perfusion to the affected extremity.  Lungs: No audible wheezing or labored breathing.  Abdomen: Nondistended  HEME/Lymph : No visible abnormalities bilateral lower extremity    Extremity:  Left femur/knee:  Skin healthy and intact  No gross  swelling or ecchymosis  No varus malalignment  No valgus malalignment   No effusion  ROM:  Full flexion   Full extension  No pain with internal rotation of the hip     No tenderness to palpation over medial joint line  No tenderness to palpation over lateral joint line  No laxity to valgus stress  No laxity to varus stress  Negative Lachman´s test  Negative anterior drawer test  Negative posterior drawer test  Negative Mark´s test     Neurovascular exam normal distally    Diagnostics:  MR femur left w and wo IV contrast    Result Date: 7/11/2024  Interpreted By:  Teodoro Blanca, STUDY: MR FEMUR LEFT W AND WO IV CONTRAST; ;  7/10/2024 2:29 pm   INDICATION: Signs/Symptoms:pain. Plain film lesion in distal femur   COMPARISON: Plain film examination of 05/17/2024   ACCESSION NUMBER(S): [id number]   ORDERING CLINICIAN: LEONCIO CUTLER   TECHNIQUE: Multiplanar and multisequential MR images of the left femur are performed including post gadolinium fat saturated T1 weighted sequences.   FINDINGS: There is a well-defined cortically based lesion in the distal left femoral diaphysis along the posteromedial cortex. This finding measures 2.0 cm in craniocaudal diameter and 1.0 x 1.0 cm in cross-section. There is a well-defined low signal margin on T1 weighted and T2 weighted sequences. The lesion is of low signal internally and of increased T1 weighted signal, similar to fatty marrow there is no bone destruction or extraosseous soft tissue component. There is no surrounding bone marrow edema or soft tissue edema. This finding corresponds to a lucency with sclerotic margin on the plain film correlate of 05/17/2024.   There are no additional sites of space-occupying osseous mass, bone marrow edema, or fracture.   There is no intramuscular mass or fluid collection. There is no muscle edema or enlargement. There is no sign of deep fascial fluid. The surrounding subcutaneous fat is unremarkable as well.   Incidentally noted  is a small left knee effusion with findings of patellofemoral arthrosis more pronounced at the lateral compartment.       2.0 x 1.0 x 1.0 cm cortically based lesion in the distal femoral diaphysis along the posteromedial cortex. Imaging characteristics are nonspecific though are nonaggressive. Considerations include intraosseous lipoma and benign fibrous lesion. Continued plain film surveillance is recommended if there is localized pain at this site.     MACRO: None   Signed by: Teodoro Blanca 7/11/2024 9:52 AM Dictation workstation:   [id number]       Procedure:  Procedures    Assessment:  54-year-old female with benign appearing distal femur lesion about the posterior medial cortex consistent with intraosseous lipoma versus benign fibrous lesion    Treatment plan:  MRI findings discussed with patient in detail we will continue with expectant management  We will have her follow-up in 3 to 6 months time for repeat imaging to ensure no interval change  MRI results consistent with a benign appearing lesion.  No erosive features.  No soft tissue reaction lesion is well marginated.  Therefore we will continue with close monitoring.  Patient currently asymptomatic  All of the patient's questions were answered.

## 2024-09-12 ENCOUNTER — APPOINTMENT (OUTPATIENT)
Dept: OTOLARYNGOLOGY | Facility: CLINIC | Age: 54
End: 2024-09-12
Payer: COMMERCIAL

## 2024-09-12 DIAGNOSIS — K14.0 GLOSSITIS: Primary | ICD-10-CM

## 2024-09-12 PROCEDURE — 3044F HG A1C LEVEL LT 7.0%: CPT | Performed by: PHYSICIAN ASSISTANT

## 2024-09-12 PROCEDURE — 99203 OFFICE O/P NEW LOW 30 MIN: CPT | Performed by: PHYSICIAN ASSISTANT

## 2024-09-12 PROCEDURE — 3048F LDL-C <100 MG/DL: CPT | Performed by: PHYSICIAN ASSISTANT

## 2024-09-12 NOTE — PROGRESS NOTES
Carla Grey is a 54 y.o. year old female patient with TONGUE ULCER     Patient presents to the office for assessment of her tongue.  Patient reports that she has been experiencing ulcerations more erythematous appearance to the tongue which was abnormal and consistent with a burning-like pain.  The symptoms do seem to be improving but not completely resolved.  She was treated for possible thrush but noted that this did not seem to improve her symptoms.  She reports that symptoms do seem to be improving spontaneously but not completely resolved.  She is without other ENT concerns.      Review of Systems   All other systems reviewed and are negative.        Physical Exam:   General appearance: No acute distress. Normal facies. Symmetric facial movement. No gross lesions of the face are noted.  The external ear structures appear normal. The ear canals patent and the tympanic membranes are intact without evidence of air-fluid levels, retraction, or congenital defects.  Anterior rhinoscopy notes essentially a midline nasal septum. Examination is noted for normal healthy mucosal membranes without any evidence of lesions, polyps, or exudate. The tongue is normally mobile. There are no lesions on the gingiva, buccal, or oral mucosa. There are no oral cavity masses.  Patient does have slightly erythematous appearance just right and left of the midline of the anterior aspect of the dorsal surface of the tongue.  This is likely resolving ulcerations but otherwise unremarkable.  The neck is negative for mass lymphadenopathy. The trachea and parotid are clear. The thyroid bed is grossly unremarkable. The salivary gland structures are grossly unremarkable.    Assessment/Plan   1.  Glossitis   patient seen today for assessment of tongue.  Patient with suspected glossitis which is showing signs of improvement.Patient will be started on Powells solution at this time.  We will see her back in 1 month for follow-up.

## 2024-10-03 ENCOUNTER — HOSPITAL ENCOUNTER (EMERGENCY)
Facility: HOSPITAL | Age: 54
Discharge: HOME | End: 2024-10-03
Payer: COMMERCIAL

## 2024-10-03 ENCOUNTER — APPOINTMENT (OUTPATIENT)
Dept: RADIOLOGY | Facility: HOSPITAL | Age: 54
End: 2024-10-03
Payer: COMMERCIAL

## 2024-10-03 VITALS
RESPIRATION RATE: 16 BRPM | SYSTOLIC BLOOD PRESSURE: 131 MMHG | BODY MASS INDEX: 41.48 KG/M2 | TEMPERATURE: 97.3 F | HEIGHT: 64 IN | HEART RATE: 62 BPM | OXYGEN SATURATION: 98 % | WEIGHT: 243 LBS | DIASTOLIC BLOOD PRESSURE: 82 MMHG

## 2024-10-03 DIAGNOSIS — M79.605 PAIN OF LEFT LOWER EXTREMITY: Primary | ICD-10-CM

## 2024-10-03 PROCEDURE — 93971 EXTREMITY STUDY: CPT

## 2024-10-03 PROCEDURE — 73552 X-RAY EXAM OF FEMUR 2/>: CPT | Mod: LEFT SIDE | Performed by: RADIOLOGY

## 2024-10-03 PROCEDURE — 99284 EMERGENCY DEPT VISIT MOD MDM: CPT | Mod: 25

## 2024-10-03 PROCEDURE — 73552 X-RAY EXAM OF FEMUR 2/>: CPT | Mod: LT

## 2024-10-03 RX ORDER — METHYLPREDNISOLONE 4 MG/1
TABLET ORAL
Qty: 21 TABLET | Refills: 0 | Status: SHIPPED | OUTPATIENT
Start: 2024-10-03

## 2024-10-03 RX ORDER — NAPROXEN 500 MG/1
500 TABLET ORAL 2 TIMES DAILY PRN
Qty: 30 TABLET | Refills: 0 | Status: SHIPPED | OUTPATIENT
Start: 2024-10-03

## 2024-10-03 ASSESSMENT — PAIN - FUNCTIONAL ASSESSMENT: PAIN_FUNCTIONAL_ASSESSMENT: 0-10

## 2024-10-03 ASSESSMENT — LIFESTYLE VARIABLES
HAVE PEOPLE ANNOYED YOU BY CRITICIZING YOUR DRINKING: NO
TOTAL SCORE: 0
EVER FELT BAD OR GUILTY ABOUT YOUR DRINKING: NO
HAVE YOU EVER FELT YOU SHOULD CUT DOWN ON YOUR DRINKING: NO
EVER HAD A DRINK FIRST THING IN THE MORNING TO STEADY YOUR NERVES TO GET RID OF A HANGOVER: NO

## 2024-10-03 ASSESSMENT — COLUMBIA-SUICIDE SEVERITY RATING SCALE - C-SSRS
6. HAVE YOU EVER DONE ANYTHING, STARTED TO DO ANYTHING, OR PREPARED TO DO ANYTHING TO END YOUR LIFE?: NO
2. HAVE YOU ACTUALLY HAD ANY THOUGHTS OF KILLING YOURSELF?: NO
1. IN THE PAST MONTH, HAVE YOU WISHED YOU WERE DEAD OR WISHED YOU COULD GO TO SLEEP AND NOT WAKE UP?: NO

## 2024-10-03 ASSESSMENT — PAIN SCALES - GENERAL: PAINLEVEL_OUTOF10: 8

## 2024-10-03 NOTE — ED PROVIDER NOTES
"HPI   Chief Complaint   Patient presents with    Leg Pain     \"Here for left upper thigh pain on the outer side.  It started Tuesday.  I think I feel a knot in it when I lay down. No history of blood clots.  No recent falls or injuries.  Is a  for a living.\"       54-year-old female presents emergency departments, patient states pain the last few days lateral aspect of the left femur.  States she has been taking Tylenol and ibuprofen for the pain, states some nausea as well, not sure if because the pain medication she has been taking her because the pain in general.  Denies any history of blood clots.  States she placed a Lidoderm patch on their, was rubbing her leg and felt a lump in that area.  States she does have history of a bony mass that they are monitoring in another area of her leg.    No falls or injuries.  No numbness tingling distal extremity.  No radiation of pain.      History provided by:  Patient   used: No            Patient History   History reviewed. No pertinent past medical history.  Past Surgical History:   Procedure Laterality Date    CHOLECYSTECTOMY      HERNIA REPAIR      MR HEAD ANGIO WO IV CONTRAST  2022    MR HEAD ANGIO WO IV CONTRAST 2022 [facility]    MR NECK ANGIO WO IV CONTRAST  2022    MR NECK ANGIO WO IV CONTRAST 2022 [facility]    OTHER SURGICAL HISTORY  2021     section    OTHER SURGICAL HISTORY  2021    Incision & drainage    OTHER SURGICAL HISTORY  2021    Tonsillectomy     Family History   Problem Relation Name Age of Onset    No Known Problems Mother      No Known Problems Father       Social History     Tobacco Use    Smoking status: Former     Types: Cigarettes    Smokeless tobacco: Never   Vaping Use    Vaping status: Never Used   Substance Use Topics    Alcohol use: Never    Drug use: Never       Physical Exam   ED Triage Vitals [10/03/24 1243]   Temperature Heart Rate " Respirations BP   36.3 °C (97.3 °F) 55 16 138/79      Pulse Ox Temp Source Heart Rate Source Patient Position   99 % Temporal Monitor Sitting      BP Location FiO2 (%)     Right arm --       Physical Exam  Constitutional: Vitals noted, no distress. Afebrile.   Cardiovascular: Regular, rate, rhythm, no murmur.   Pulmonary: Lungs clear bilaterally with good aeration. No adventitious breath sounds.   Gastrointestinal: Soft, nonsurgical. Nontender. No peritoneal signs. Normoactive bowel sounds.   Musculoskeletal: Tenderness lateral aspect of the left mid femur, there is a moderate-sized palpable soft tissue mass.  Neurovascular intact distally with a easily palpable DP pulse  Skin: No rash.   Neuro: No focal neurologic deficits, NIH score of 0.      ED Course & MDM   Diagnoses as of 10/03/24 1459   Pain of left lower extremity     Labs Reviewed - No data to display     Lower extremity venous duplex left   Final Result   No evidence for DVT within the left lower extremity.     Signed by Shahzad Mejias MD      XR femur left 2+ views    (Results Pending)                 No data recorded                           Medical Decision Making  Describes pain very specific mid lateral left femur.    Ultrasound imaging was obtained, ultimately unremarkable.  X-ray left femur, as interpreted by me, showed no osseous abnormality.    Discussed results with the patient, discussed trial of anti-inflammatory and steroid, did recommend close follow-up with Ortho, return with any worsening symptoms or additional concerns.    Procedure  Procedures     KRISTEN Manriquez-CNP  10/03/24 5130

## 2024-10-09 ENCOUNTER — APPOINTMENT (OUTPATIENT)
Dept: SURGERY | Facility: CLINIC | Age: 54
End: 2024-10-09
Payer: COMMERCIAL

## 2024-10-14 ENCOUNTER — APPOINTMENT (OUTPATIENT)
Dept: OTOLARYNGOLOGY | Facility: CLINIC | Age: 54
End: 2024-10-14
Payer: COMMERCIAL

## 2024-10-25 ENCOUNTER — APPOINTMENT (OUTPATIENT)
Dept: OTOLARYNGOLOGY | Facility: CLINIC | Age: 54
End: 2024-10-25
Payer: COMMERCIAL

## 2024-10-25 DIAGNOSIS — K14.0 GLOSSITIS: Primary | ICD-10-CM

## 2024-10-25 PROCEDURE — 3044F HG A1C LEVEL LT 7.0%: CPT | Performed by: OTOLARYNGOLOGY

## 2024-10-25 PROCEDURE — 99213 OFFICE O/P EST LOW 20 MIN: CPT | Performed by: OTOLARYNGOLOGY

## 2024-10-25 PROCEDURE — 3048F LDL-C <100 MG/DL: CPT | Performed by: OTOLARYNGOLOGY

## 2024-10-25 RX ORDER — TRIAMCINOLONE ACETONIDE 5 MG/G
CREAM TOPICAL 3 TIMES DAILY
Qty: 15 G | Refills: 0 | Status: SHIPPED | OUTPATIENT
Start: 2024-10-25

## 2024-10-25 NOTE — PROGRESS NOTES
Patient returns.  Seeing her back today follow-up check history of glossitis.  Seems to wax and wane.  She denies any other worrisome ENT symptoms or signs.  Remaining head neck inquiry is otherwise clear.  She did utilize the Powells solution with maybe some positive benefit but not long-lasting.  There have been no significant changes in past medical or past surgical histories except as mentioned.    Exam:  No acute distress.  The external ear structures appear normal. The ear canals patent and the tympanic membranes are intact without evidence of air-fluid levels, retraction, or congenital defects.  Anterior rhinoscopy notes essentially a midline nasal septum. Examination is noted for normal healthy mucosal membranes without any evidence of lesions, polyps, or exudate. The tongue is normally mobile.  Overall appearance of the tongue looks fairly good today.  There are no lesions on the gingiva, buccal, or oral mucosa. There are no oral cavity masses.  The neck is negative for mass lymphadenopathy. The trachea and parotid are clear. The thyroid bed is grossly unremarkable. The salivary gland structures are grossly unremarkable.    Assessment and plan:  History of glossitis.  In general looks better today and I am not sure if that is just the waxing and waning phenomenon or if indeed it relates to use of the Powells solution.  Nonetheless, I am going to go back to the utilization of the Kenalog base paste.  We will see how she fares in this regard and she will update me accordingly.  All questions were answered in this regard accordingly.

## 2024-11-27 ENCOUNTER — OFFICE VISIT (OUTPATIENT)
Dept: OTOLARYNGOLOGY | Facility: CLINIC | Age: 54
End: 2024-11-27
Payer: COMMERCIAL

## 2024-11-27 DIAGNOSIS — K14.0 GLOSSITIS: Primary | ICD-10-CM

## 2024-11-27 PROCEDURE — 3048F LDL-C <100 MG/DL: CPT | Performed by: OTOLARYNGOLOGY

## 2024-11-27 PROCEDURE — 3044F HG A1C LEVEL LT 7.0%: CPT | Performed by: OTOLARYNGOLOGY

## 2024-11-27 PROCEDURE — 99213 OFFICE O/P EST LOW 20 MIN: CPT | Performed by: OTOLARYNGOLOGY

## 2024-11-27 NOTE — PROGRESS NOTES
Patient returns.  Seeing her back today for recheck on her tongue.  This seems again to wax and wane and is nature and more recently she has developed a little area of concern on the right anterior right tip of the tongue only because it burns depending on what she eats.  There is no true intrinsic pain otherwise.  This is the first time she has noted this particular irregularity as previous she has had more of a diffuse issue.  All remaining ENT inquiry is otherwise unremarkable.  No other change in past medical surgical history.    Physical exam:  No acute distress.  The external ear structures appear normal. The ear canals patent and the tympanic membranes are intact without evidence of air-fluid levels, retraction, or congenital defects.  Anterior rhinoscopy notes essentially a midline nasal septum. Examination is noted for normal healthy mucosal membranes without any evidence of lesions, polyps, or exudate.  Oral cavity noted for a small focus of irritation right anterior tongue tip.  Palpation reveals no induration.  The neck is negative for mass lymphadenopathy. The trachea and parotid are clear. The thyroid bed is grossly unremarkable. The salivary gland structures are grossly unremarkable.      Assessment and plan:  Glossitis variant.  Will start her on Carafate slurry and see how she fares.  She will update me over the next month.  All questions were answered in this regard accordingly.

## 2024-12-04 ENCOUNTER — APPOINTMENT (OUTPATIENT)
Dept: SURGERY | Facility: CLINIC | Age: 54
End: 2024-12-04
Payer: COMMERCIAL

## 2024-12-10 ENCOUNTER — TELEPHONE (OUTPATIENT)
Dept: OTOLARYNGOLOGY | Facility: CLINIC | Age: 54
End: 2024-12-10
Payer: COMMERCIAL

## 2024-12-10 NOTE — TELEPHONE ENCOUNTER
Patient called stating that the Carafate has been the only medication that has helped. She was wondering if that was something that could be refilled?

## 2025-01-06 ENCOUNTER — LAB (OUTPATIENT)
Dept: LAB | Facility: LAB | Age: 55
End: 2025-01-06
Payer: COMMERCIAL

## 2025-01-06 DIAGNOSIS — E55.9 VITAMIN D DEFICIENCY, UNSPECIFIED: Primary | ICD-10-CM

## 2025-01-06 DIAGNOSIS — E11.9 TYPE 2 DIABETES MELLITUS WITHOUT COMPLICATIONS (MULTI): ICD-10-CM

## 2025-01-06 LAB
25(OH)D3 SERPL-MCNC: 40 NG/ML (ref 30–100)
EST. AVERAGE GLUCOSE BLD GHB EST-MCNC: 126 MG/DL
HBA1C MFR BLD: 6 %

## 2025-01-06 PROCEDURE — 82306 VITAMIN D 25 HYDROXY: CPT

## 2025-01-06 PROCEDURE — 83036 HEMOGLOBIN GLYCOSYLATED A1C: CPT

## 2025-01-22 ENCOUNTER — APPOINTMENT (OUTPATIENT)
Dept: ORTHOPEDIC SURGERY | Facility: CLINIC | Age: 55
End: 2025-01-22
Payer: COMMERCIAL

## 2025-02-03 ENCOUNTER — OFFICE VISIT (OUTPATIENT)
Dept: ORTHOPEDIC SURGERY | Facility: CLINIC | Age: 55
End: 2025-02-03
Payer: COMMERCIAL

## 2025-02-03 ENCOUNTER — HOSPITAL ENCOUNTER (OUTPATIENT)
Dept: RADIOLOGY | Facility: CLINIC | Age: 55
Discharge: HOME | End: 2025-02-03
Payer: COMMERCIAL

## 2025-02-03 DIAGNOSIS — M89.9 BONE LESION: ICD-10-CM

## 2025-02-03 PROCEDURE — 3044F HG A1C LEVEL LT 7.0%: CPT | Performed by: STUDENT IN AN ORGANIZED HEALTH CARE EDUCATION/TRAINING PROGRAM

## 2025-02-03 PROCEDURE — 73552 X-RAY EXAM OF FEMUR 2/>: CPT | Mod: LT

## 2025-02-03 PROCEDURE — 99213 OFFICE O/P EST LOW 20 MIN: CPT | Performed by: STUDENT IN AN ORGANIZED HEALTH CARE EDUCATION/TRAINING PROGRAM

## 2025-02-03 PROCEDURE — 73552 X-RAY EXAM OF FEMUR 2/>: CPT | Mod: LEFT SIDE | Performed by: STUDENT IN AN ORGANIZED HEALTH CARE EDUCATION/TRAINING PROGRAM

## 2025-02-03 NOTE — PROGRESS NOTES
Chief Complaint   Patient presents with    Left Thigh - Follow-up     Benign distal femur lesion, xrays today       HPI  54-year-old female presents today for evaluation of her left femur.  Patient was recently seen at  facility and was found to have a distal femur lesion.  Did obtain a MRI 2024 this was benign appearing.  Presents today for serial x-rays 6 months prior.  No pain or discomfort.  No new symptoms.  Ambulating independently.    No past medical history on file.    Past Surgical History:   Procedure Laterality Date    CHOLECYSTECTOMY      HERNIA REPAIR      MR HEAD ANGIO WO IV CONTRAST  2022    MR HEAD ANGIO WO IV CONTRAST 2022 [facility]    MR NECK ANGIO WO IV CONTRAST  2022    MR NECK ANGIO WO IV CONTRAST 2022 [facility]    OTHER SURGICAL HISTORY  2021     section    OTHER SURGICAL HISTORY  2021    Incision & drainage    OTHER SURGICAL HISTORY  2021    Tonsillectomy        Allergies   Allergen Reactions    Bee Venom Protein (Honey Bee) Anaphylaxis    Penicillins Anaphylaxis and Hives    Sulfa (Sulfonamide Antibiotics) Hives    Sulfamethoxazole-Trimethoprim Other        Physical exam    General: Alert and oriented to place, person, and time.  No acute distress and breathing comfortably; pleasant and cooperative with the examination.  HEENT: Head is normocephalic and atraumatic.  Neck: Supple, no visible swelling.  Cardiovascular: Good perfusion to the affected extremity.  Lungs: No audible wheezing or labored breathing.  Abdomen: Nondistended  HEME/Lymph : No visible abnormalities bilateral lower extremity    Extremity:  Left femur/knee:  Skin healthy and intact  No gross swelling or ecchymosis  No varus malalignment  No valgus malalignment   No effusion  ROM:  Full flexion   Full extension  No pain with internal rotation of the hip     No tenderness to palpation over medial joint line  No tenderness to palpation over lateral  joint line  No laxity to valgus stress  No laxity to varus stress  Negative Lachman´s test  Negative anterior drawer test  Negative posterior drawer test  Negative Mark´s test     Neurovascular exam normal distally    Diagnostics:  XR femur left 2+ views    Result Date: 2/3/2025  Interpreted By:  Leoncio Cutler III, STUDY: XR FEMUR LEFT 2+ VIEWS; ;  2/3/2025 2:08 pm   INDICATION: Signs/Symptoms:pain.   ,M89.9 Disorder of bone, unspecified   COMPARISON: None.   ACCESSION NUMBER(S): [id number]   ORDERING CLINICIAN: LEONCIO CUTLER   FINDINGS: AP femur, lateral femur: No acute osseous abnormality no fracture or dislocation appreciated continued demonstration of eccentric metaphyseal lesion about the distal femur no interval change in comparison to prior x-rays. This is benign-appearing, moderate degenerative changes about the knee articulation       As above     MACRO: None   Signed by: Leoncio Cutler III 2/3/2025 2:20 PM Dictation workstation:   [id number]     Procedure:  Procedures    Assessment:  54-year-old female with benign appearing distal femur lesion about the posterior medial cortex consistent with intraosseous lipoma versus benign fibrous lesion    Treatment plan:  X-rays reviewed with Carla today no interval change  Discussed activities to avoid as well as importance of using pain as a guide  As there has been no interval change over the course of nearly 1 year she will follow-up as needed  Discussed activities to avoid as well as importance of use pain as a guide if she has any symptoms she return to clinic for repeat evaluation.  She does have moderate arthritis on her x-rays if this does develop to cause her symptoms some she will return to clinic for an intra-articular cortisone injection  Patient currently asymptomatic  All of the patient's questions were answered.

## 2025-03-13 ENCOUNTER — HOSPITAL ENCOUNTER (EMERGENCY)
Age: 55
Discharge: HOME | End: 2025-03-13
Attending: EMERGENCY MEDICINE
Payer: COMMERCIAL

## 2025-03-13 ENCOUNTER — HOSPITAL ENCOUNTER (EMERGENCY)
Facility: HOSPITAL | Age: 55
Discharge: LEFT BEFORE DISCHARGE | End: 2025-03-13
Payer: COMMERCIAL

## 2025-03-13 VITALS
HEIGHT: 64 IN | DIASTOLIC BLOOD PRESSURE: 77 MMHG | OXYGEN SATURATION: 96 % | HEART RATE: 73 BPM | TEMPERATURE: 98.5 F | SYSTOLIC BLOOD PRESSURE: 151 MMHG | WEIGHT: 255 LBS | RESPIRATION RATE: 16 BRPM | BODY MASS INDEX: 43.54 KG/M2

## 2025-03-13 DIAGNOSIS — L98.9 SKIN LESION OF FACE: Primary | ICD-10-CM

## 2025-03-13 PROCEDURE — 4500999001 HC ED NO CHARGE

## 2025-03-13 PROCEDURE — 6370000000 HC RX 637 (ALT 250 FOR IP): Performed by: EMERGENCY MEDICINE

## 2025-03-13 PROCEDURE — 99283 EMERGENCY DEPT VISIT LOW MDM: CPT

## 2025-03-13 RX ORDER — CLINDAMYCIN HYDROCHLORIDE 150 MG/1
300 CAPSULE ORAL ONCE
Status: COMPLETED | OUTPATIENT
Start: 2025-03-13 | End: 2025-03-13

## 2025-03-13 RX ORDER — CLINDAMYCIN HYDROCHLORIDE 300 MG/1
300 CAPSULE ORAL 4 TIMES DAILY
Qty: 40 CAPSULE | Refills: 0 | Status: SHIPPED | OUTPATIENT
Start: 2025-03-13 | End: 2025-03-23

## 2025-03-13 RX ORDER — MUPIROCIN 20 MG/G
OINTMENT TOPICAL
Qty: 30 G | Refills: 0 | Status: SHIPPED | OUTPATIENT
Start: 2025-03-13 | End: 2025-03-20

## 2025-03-13 RX ADMIN — CLINDAMYCIN HYDROCHLORIDE 300 MG: 150 CAPSULE ORAL at 22:22

## 2025-03-13 ASSESSMENT — ENCOUNTER SYMPTOMS
SINUS PRESSURE: 0
SORE THROAT: 0
VOMITING: 0
BACK PAIN: 0
ABDOMINAL DISTENTION: 0
NAUSEA: 0
COUGH: 0
DIARRHEA: 0
PHOTOPHOBIA: 0
APNEA: 0
WHEEZING: 0
CONSTIPATION: 0
ABDOMINAL PAIN: 0
EYE PAIN: 0
RHINORRHEA: 0
COLOR CHANGE: 0
ROS SKIN COMMENTS: SKIN LESIONS
SHORTNESS OF BREATH: 0

## 2025-03-14 NOTE — ED PROVIDER NOTES
[facility] EMERGENCY DEPARTMENT  eMERGENCY dEPARTMENT eNCOUnter      Pt Name: Uma Lorenzo  MRN: [id number]  Birthdate 1970  Date of evaluation: 3/13/2025  Provider: Shimon Deleon MD    CHIEF COMPLAINT       Chief Complaint   Patient presents with    Facial Swelling     On her cheek, below left eye. Pain with touch         HISTORY OF PRESENT ILLNESS   (Location/Symptom, Timing/Onset,Context/Setting, Quality, Duration, Modifying Factors, Severity)  Note limiting factors.   Uma Lorenzo is a 55 y.o. female who presents to the emergency department with complaint of concern for skin infection.  Had a bump on the left side of neck last week and today a bump on the left lower eyelid.  She is diabetic and is concerned about a skin infection.  She denies fever or chills.  No nausea or vomiting.  No diarrhea or constipation.  No chest pain, palpitations, orthopnea or PND.  Denies any other systemic symptoms.  She is morbidly obese with BMI of 43.77.  Other comorbid conditions include spondylosis of the lumbar region, hypertension, hyperlipidemia.    HPI    Nursing Notes were reviewed.    REVIEW OF SYSTEMS    (2-9 systems for level 4, 10 or more for level 5)     Review of Systems   Constitutional: Negative.  Negative for activity change, appetite change, chills, fatigue and fever.   HENT:  Negative for congestion, ear discharge, ear pain, hearing loss, rhinorrhea, sinus pressure and sore throat.    Eyes:  Negative for photophobia, pain and visual disturbance.   Respiratory:  Negative for apnea, cough, shortness of breath and wheezing.    Cardiovascular:  Negative for chest pain, palpitations and leg swelling.   Gastrointestinal:  Negative for abdominal distention, abdominal pain, constipation, diarrhea, nausea and vomiting.   Endocrine: Negative for cold intolerance, heat intolerance and polyuria.   Genitourinary:  Negative for dysuria, flank pain, frequency and urgency.   Musculoskeletal:  Negative for arthralgias, back pain,

## 2025-03-26 ENCOUNTER — APPOINTMENT (OUTPATIENT)
Dept: SURGERY | Facility: CLINIC | Age: 55
End: 2025-03-26
Payer: COMMERCIAL

## 2025-03-26 VITALS
OXYGEN SATURATION: 96 % | WEIGHT: 254 LBS | DIASTOLIC BLOOD PRESSURE: 85 MMHG | BODY MASS INDEX: 43.36 KG/M2 | SYSTOLIC BLOOD PRESSURE: 126 MMHG | HEART RATE: 59 BPM | HEIGHT: 64 IN

## 2025-03-26 DIAGNOSIS — K43.9 VENTRAL HERNIA WITHOUT OBSTRUCTION OR GANGRENE: Primary | ICD-10-CM

## 2025-03-26 PROCEDURE — 3079F DIAST BP 80-89 MM HG: CPT | Performed by: SURGERY

## 2025-03-26 PROCEDURE — 99213 OFFICE O/P EST LOW 20 MIN: CPT | Performed by: SURGERY

## 2025-03-26 PROCEDURE — 3044F HG A1C LEVEL LT 7.0%: CPT | Performed by: SURGERY

## 2025-03-26 PROCEDURE — 1036F TOBACCO NON-USER: CPT | Performed by: SURGERY

## 2025-03-26 PROCEDURE — 3074F SYST BP LT 130 MM HG: CPT | Performed by: SURGERY

## 2025-03-26 PROCEDURE — 3008F BODY MASS INDEX DOCD: CPT | Performed by: SURGERY

## 2025-03-26 ASSESSMENT — ENCOUNTER SYMPTOMS
PSYCHIATRIC NEGATIVE: 1
MUSCULOSKELETAL NEGATIVE: 1
ALLERGIC/IMMUNOLOGIC NEGATIVE: 1
NEUROLOGICAL NEGATIVE: 1
CONSTITUTIONAL NEGATIVE: 1
ENDOCRINE NEGATIVE: 1
RESPIRATORY NEGATIVE: 1
HEMATOLOGIC/LYMPHATIC NEGATIVE: 1
CARDIOVASCULAR NEGATIVE: 1
EYES NEGATIVE: 1
GASTROINTESTINAL NEGATIVE: 1

## 2025-03-26 NOTE — PROGRESS NOTES
Subjective   Patient ID: Carla Grey is a 55 y.o. female who presents for Follow-up.  HPI  Initial presentation: Remote laparoscopic cholecystectomy findings of umbilical hernia closed primarily at the time of that surgery. Patient has now undertaken an aggressive weight loss regimen with approximately 40 pound weight loss with diet modifications and increase physical activity, and now noting bulge at the umbilicus eccentric to the left. No pain. Spontaneously reducible with lying supine. No nausea or vomiting.    Working on weight loss and remains actively motivated.  Unplanned weight gain over the end of  secondary to stress from 's chronic illness and orthopedic issues.    BMI 43.77 2023 (255 lb)  BMI 43.7  2024  BMI 43.6  3/2025  (254 lb)    Review of Systems   Constitutional: Negative.    HENT: Negative.     Eyes: Negative.    Respiratory: Negative.     Cardiovascular: Negative.    Gastrointestinal: Negative.    Endocrine: Negative.    Genitourinary: Negative.    Musculoskeletal: Negative.    Skin: Negative.    Allergic/Immunologic: Negative.    Neurological: Negative.    Hematological: Negative.    Psychiatric/Behavioral: Negative.           History reviewed. No pertinent past medical history.  Past Surgical History:   Procedure Laterality Date    CHOLECYSTECTOMY      HERNIA REPAIR      MR HEAD ANGIO WO IV CONTRAST  2022    MR HEAD ANGIO WO IV CONTRAST 2022 [facility]    MR NECK ANGIO WO IV CONTRAST  2022    MR NECK ANGIO WO IV CONTRAST 2022 [facility]    OTHER SURGICAL HISTORY  2021     section    OTHER SURGICAL HISTORY  2021    Incision & drainage    OTHER SURGICAL HISTORY  2021    Tonsillectomy     Family History   Problem Relation Name Age of Onset    No Known Problems Mother      No Known Problems Father        Social History     Socioeconomic History    Marital status:    Tobacco Use    Smoking status: Former      Types: Cigarettes    Smokeless tobacco: Never   Vaping Use    Vaping status: Never Used   Substance and Sexual Activity    Alcohol use: Never    Drug use: Never    Sexual activity: Defer          Current Outpatient Medications:     ALPRAZolam (Xanax) 1 mg tablet, Take 1 tablet (1 mg) by mouth as needed at bedtime., Disp: , Rfl:     aspirin 81 mg chewable tablet, aspirin 81 mg oral tablet, dispersible  Quantity: 0  Refills: 0  Ordered: 17-Oct-2019 Frankie Graham Generic Substitution Allowed, Disp: , Rfl:     atenolol (Tenormin) 25 mg tablet, Take 1 tablet (25 mg) by mouth 2 times a day., Disp: , Rfl:     atorvastatin (Lipitor) 40 mg tablet, Take 1 tablet (40 mg) by mouth once daily at bedtime., Disp: , Rfl:     calcium carbonate-vitamin D3 600 mg-5 mcg (200 unit) tablet, Take 1 tablet by mouth once daily., Disp: , Rfl:     cholecalciferol (Vitamin D-3) 25 MCG (1000 UT) tablet, Take 1 tablet (1,000 Units) by mouth once daily., Disp: , Rfl:     cloNIDine (Catapres) 0.3 mg tablet, Take 1 tablet (0.3 mg) by mouth once daily., Disp: , Rfl:     dicyclomine (Bentyl) 20 mg tablet, TAKE 1 TABLET BY MOUTH EVERY 6 HOURS AS NEEDED FOR ADBOMINAL CRAMPS, Disp: , Rfl:     docusate sodium (Colace) 100 mg capsule, Take 1 capsule (100 mg) by mouth twice a day., Disp: , Rfl:     doxycycline (Monodox) 100 mg capsule, Take 1 capsule (100 mg) by mouth 2 times a day., Disp: , Rfl:     EPINEPHrine (EpiPen 2-Noman) 0.3 mg/0.3 mL injection syringe, Inject 0.3 mL (0.3 mg) into the muscle 1 time if needed., Disp: , Rfl:     hydroCHLOROthiazide (Microzide) 12.5 mg capsule, Take 1 capsule (12.5 mg) by mouth once daily., Disp: , Rfl:     ibuprofen 800 mg tablet, Take 1 tablet (800 mg) by mouth 3 times a day as needed., Disp: , Rfl:     L. acidophilus/Bifid. animalis 15.5 billion cell capsule, 1 cap(s) orally once a day, Disp: , Rfl:     lidocaine (Lidoderm) 5 % patch, APPLY 1 PATCH TO THE SKIN EVERY DAY AND WEAR FOR 12 HOURS AS NEEDED FOR BACK PAIN,  Disp: , Rfl:     metFORMIN (Glucophage) 500 mg tablet, Take 1 tablet (500 mg) by mouth once daily. Take with food., Disp: , Rfl:     Mucus Relief  mg 12 hr tablet, Take 1 tablet (600 mg) by mouth every 12 hours if needed for congestion., Disp: , Rfl:     naproxen (Naprosyn) 500 mg tablet, Take 1 tablet (500 mg) by mouth 2 times a day as needed for mild pain (1 - 3)., Disp: 30 tablet, Rfl: 0    omeprazole (PriLOSEC) 40 mg DR capsule, Take by mouth., Disp: , Rfl:     ondansetron ODT (Zofran-ODT) 4 mg disintegrating tablet, DISSOLVE ONE TABLET IN MOUTH EVERY 6 HOURS AS NEEDED FOR NAUSEA AND VOMITING, Disp: , Rfl:     terconazole (Terazol 7) 0.4 % vaginal cream, USE AS DIRECTED, Disp: , Rfl:     tiZANidine (Zanaflex) 4 mg tablet, TAKE ONE TABLET BY MOUTH EVERY 8 HOURS AS NEEDED FOR MUSCLE SPASM, Disp: , Rfl:     methylPREDNISolone (Medrol Dospak) 4 mg tablets, Follow schedule on package instructions, Disp: 21 tablet, Rfl: 0    predniSONE (Deltasone) 20 mg tablet, Day 1-3: 1 pill, three times a day Day: 4-6: 1 pill, two times a day Day: 7-9: 1 pill, once a day, Disp: 18 tablet, Rfl: 0    triamcinolone (Kenalog) 0.5 % cream, Apply topically 3 times a day., Disp: 15 g, Rfl: 0     Objective   Vitals:    03/26/25 1432   BP: 126/85   Pulse: 59   SpO2: 96%      Physical Exam  Constitutional - General appearance: In no acute distress, well appearing and well nourished.   Pulmonary - Respiratory effort: Normal respiration.   Abdomen - Abdomen: Non-tender, no abdominal masses.~Well-healed infraumbilical incision with focal ventral hernia eccentric to left, spontaneously reduced suspected 3 cm defect although limited by body habitus.  Truncal obesity.    Assessment/Plan   Problem List Items Addressed This Visit    None        Reassured regarding the presence of asymptomatic umbilical ventral hernia. Would pursue elective repair with BMI under 35. Use of mesh discussed. Potential need for more urgent intervention in the  setting of incarceration also discussed. Patient remains extremely motivated in her weight loss goals. She will continue to pursue this course without any activity restrictions from my standpoint. Follow-up in 6 months for reevaluation    Taylor Huang MD

## 2025-06-04 ENCOUNTER — APPOINTMENT (OUTPATIENT)
Dept: ORTHOPEDIC SURGERY | Facility: CLINIC | Age: 55
End: 2025-06-04
Payer: COMMERCIAL

## 2025-09-24 ENCOUNTER — APPOINTMENT (OUTPATIENT)
Dept: SURGERY | Facility: CLINIC | Age: 55
End: 2025-09-24
Payer: COMMERCIAL